# Patient Record
Sex: FEMALE | Race: BLACK OR AFRICAN AMERICAN | NOT HISPANIC OR LATINO | Employment: FULL TIME | URBAN - METROPOLITAN AREA
[De-identification: names, ages, dates, MRNs, and addresses within clinical notes are randomized per-mention and may not be internally consistent; named-entity substitution may affect disease eponyms.]

---

## 2017-01-03 ENCOUNTER — HOSPITAL ENCOUNTER (EMERGENCY)
Facility: OTHER | Age: 22
Discharge: HOME | End: 2017-01-03
Attending: OBSTETRICS & GYNECOLOGY | Admitting: OBSTETRICS & GYNECOLOGY
Payer: MEDICAID

## 2017-01-03 ENCOUNTER — ROUTINE PRENATAL (OUTPATIENT)
Dept: OBSTETRICS AND GYNECOLOGY | Facility: CLINIC | Age: 22
End: 2017-01-03
Payer: MEDICAID

## 2017-01-03 ENCOUNTER — ANESTHESIA EVENT (OUTPATIENT)
Dept: OBSTETRICS AND GYNECOLOGY | Facility: OTHER | Age: 22
End: 2017-01-03
Payer: MEDICAID

## 2017-01-03 ENCOUNTER — ANESTHESIA (OUTPATIENT)
Dept: OBSTETRICS AND GYNECOLOGY | Facility: OTHER | Age: 22
End: 2017-01-03
Payer: MEDICAID

## 2017-01-03 VITALS
WEIGHT: 163 LBS | DIASTOLIC BLOOD PRESSURE: 82 MMHG | OXYGEN SATURATION: 100 % | HEART RATE: 106 BPM | RESPIRATION RATE: 18 BRPM | TEMPERATURE: 98 F | BODY MASS INDEX: 27.98 KG/M2 | SYSTOLIC BLOOD PRESSURE: 121 MMHG

## 2017-01-03 VITALS
DIASTOLIC BLOOD PRESSURE: 80 MMHG | WEIGHT: 163.56 LBS | SYSTOLIC BLOOD PRESSURE: 130 MMHG | BODY MASS INDEX: 28.08 KG/M2

## 2017-01-03 DIAGNOSIS — O34.33 CERVICAL CERCLAGE SUTURE PRESENT IN THIRD TRIMESTER: ICD-10-CM

## 2017-01-03 DIAGNOSIS — O09.293: ICD-10-CM

## 2017-01-03 DIAGNOSIS — O34.33 CERVICAL CERCLAGE SUTURE PRESENT, THIRD TRIMESTER: Primary | ICD-10-CM

## 2017-01-03 DIAGNOSIS — O09.93 SUPERVISION OF HIGH-RISK PREGNANCY, THIRD TRIMESTER: Primary | ICD-10-CM

## 2017-01-03 DIAGNOSIS — O34.33 CERVICAL CERCLAGE SUTURE PRESENT, THIRD TRIMESTER: ICD-10-CM

## 2017-01-03 PROCEDURE — 99213 OFFICE O/P EST LOW 20 MIN: CPT | Mod: TH,S$PBB,, | Performed by: STUDENT IN AN ORGANIZED HEALTH CARE EDUCATION/TRAINING PROGRAM

## 2017-01-03 PROCEDURE — 59025 FETAL NON-STRESS TEST: CPT | Mod: 26,,, | Performed by: OBSTETRICS & GYNECOLOGY

## 2017-01-03 PROCEDURE — 59025 FETAL NON-STRESS TEST: CPT

## 2017-01-03 PROCEDURE — 99999 PR PBB SHADOW E&M-EST. PATIENT-LVL II: CPT | Mod: PBBFAC,,, | Performed by: STUDENT IN AN ORGANIZED HEALTH CARE EDUCATION/TRAINING PROGRAM

## 2017-01-03 PROCEDURE — 99283 EMERGENCY DEPT VISIT LOW MDM: CPT | Mod: 25,27

## 2017-01-03 PROCEDURE — 99283 EMERGENCY DEPT VISIT LOW MDM: CPT | Mod: 25,,, | Performed by: OBSTETRICS & GYNECOLOGY

## 2017-01-03 RX ORDER — BUTORPHANOL TARTRATE 1 MG/ML
1 INJECTION INTRAMUSCULAR; INTRAVENOUS ONCE
Status: DISCONTINUED | OUTPATIENT
Start: 2017-01-03 | End: 2017-01-03

## 2017-01-03 NOTE — MR AVS SNAPSHOT
[place] [facility]  [address] [facility] [address]  Phone: [phone]                  Juanita Lipscomb   1/3/2017 3:00 PM   Routine Prenatal    Description:  Female : 1995   Provider:  Joaquin Andrew MD   Department:  [place] [facility]           Reason for Visit     Routine Prenatal Visit           Diagnoses this Visit        Comments    Supervision of high-risk pregnancy, third trimester    -  Primary            To Do List           Goals (5 Years of Data)     None      Follow-Up and Disposition     Return in about 1 week (around 1/10/2017).      [facility]     [facility] -  Assistance  Registered nurses in the [facility] provide clinical advisement, health education, appointment booking, and other advisory services.  Call for this free service at [phone].             Medications           Message regarding Medications     Verify the changes and/or additions to your medication regime listed below are the same as discussed with your clinician today.  If any of these changes or additions are incorrect, please notify your healthcare provider.             Verify that the below list of medications is an accurate representation of the medications you are currently taking.  If none reported, the list may be blank. If incorrect, please contact your healthcare provider. Carry this list with you in case of emergency.           Current Medications     PRENATAL VIT #76/IRON,CARB/FA (PNV 29-1 ORAL) Take 1 tablet by mouth once daily.    promethazine (PHENERGAN) 25 MG tablet Take 1 tablet (25 mg total) by mouth every 4 (four) hours.           Clinical Reference Information           Prenatal Vitals     Enc. Date GA Prenatal Vitals Prenatal Pulse Pain Level Urine Albumin/Glucose Edema Presentation Dilation/Effacement/Station    1/3/17 36w2d 130/80 / 74.2 kg (163 lb 9.3 oz)   0 Negative / Negative None / None / None      16 35w4d 112/72  "/ 73.8 kg (162 lb 11.2 oz) 35 cm / 145 / Present  0 Negative / Negative None / None      16 32w4d 104/64 / 73.4 kg (161 lb 13.1 oz) 32 cm / 150 / Present  0 Negative / Negative None / None / None      16 30w3d 100/60 / 71.4 kg (157 lb 6.5 oz) 30 cm / 132 / Present  0 Negative / Negative None / None / None      16 28w2d 110/60 / 70.1 kg (154 lb 8.7 oz) 28 cm / 148 / Present  0 Negative / Negative None / None / None      10/25/16 26w2d Admission Dx: Pelvic pain affecting pregnancy Dept: [facility], [place], operated by [facility] OB ASSE    10/25/16 26w2d 102/60 / 69.1 kg (152 lb 5.4 oz)   9  None / None / None      10/4/16 23w2d 110/70 / 66.9 kg (147 lb 7.8 oz) 21 cm / 146 / Present  0 Negative / Negative None / None / None      16 19w2d 110/60 / 64.4 kg (141 lb 15.6 oz) 20 cm / positive  0 Negative / Negative None / None / None      8/10/16 15w3d 110/60 / 63.1 kg (139 lb 1.8 oz) 15 cm / pos  0 Negative / Negative None / None / None      16 14w2d Admission Dx: History of incompetent cervix, currently pregnant, second trimester Dept: [facility], [place], operated by [facility] L&D    16 11w4d 120/70 / 61.4 kg (135 lb 5.8 oz)  / + / Absent  0 Negative / Negative None / None / None      16 9w4d 100/60 / 59.4 kg (130 lb 15.3 oz)           16 8w4d 120/68 / 58.1 kg (128 lb 1.4 oz)   7 Negative / Negative None / None / None         TW.1 kg (35 lb 7.9 oz)   Pregravid weight: 58.1 kg (128 lb 1.4 oz)   Number of fetuses: 1   Height: 5' 4" (1.626 m)   BMI: 22.0       Vital Signs - Last Recorded  Most recent update: 1/3/2017  3:25 PM by Jimena Holliday LPN    BP Wt LMP BMI       130/80 74.2 kg (163 lb 9.3 oz) 04/15/2016 28.08 kg/m2       Allergies as of 1/3/2017     Watermelon      Immunizations Administered on Date of Encounter - 1/3/2017     None      "

## 2017-01-03 NOTE — PROGRESS NOTES
Complaints today: Patient complains of intermittent episodes of lightheadedness and blurry vision, most recently in the car earlier today. Occasional painless contractions. Denies VB, LOF. Good FM.    Visit Vitals    /80    Wt 74.2 kg (163 lb 9.3 oz)    LMP 04/15/2016    BMI 28.08 kg/m2       21 y.o., at 36w2d by Estimated Date of Delivery: 17  Patient Active Problem List   Diagnosis    Supervision of high-risk pregnancy (bottle feed/mirena/flu done/TDAP ordered)    History of incompetent cervix, currently pregnant    Cervical cerclage suture present     OB History    Para Term  AB SAB TAB Ectopic Multiple Living   3 2 2 0 0 0 0 0 0 2      # Outcome Date GA Lbr Tom/2nd Weight Sex Delivery Anes PTL Lv   3 Current            2 Term 14 38w0d  3.402 kg (7 lb 8 oz) M Vag-Spont EPI N Y   1 Term 11 37w0d  3.147 kg (6 lb 15 oz) M Vag-Spont EPI N Y          Dating reviewed    Allergies and problem list reviewed and updated    Medical and surgical history reviewed    Prenatal labs reviewed and updated    Physical Exam:  ABD: soft, gravid, nontender    Assessment:  22yo  at 36.2 wga for prenatal visit    Plan:   No complaints today  Attempted cerclage removal for second time. Patient unable to tolerate exam. Will send to [facility] for cerclage removal today     follow up 1 Weeks, kick counts, labor precautions discussed    Herman Andrew MD  PGY-1 OB/GYN  [phone]

## 2017-01-03 NOTE — ED PROVIDER NOTES
Encounter Date: 1/3/2017       History     Chief Complaint   Patient presents with    Cerclage removal     Review of patient's allergies indicates:   Allergen Reactions    Watermelon Shortness Of Breath, Itching, Swelling and Edema     Chest pain, angioedema, SOB, after eating watermelon tonight     HPI Comments: Juanita Lipscomb is a 21 y.o. [id number] at 36w2d presents from clinic for cerclage removal 2/2 inability to tolerate removal in clinic.   This IUP is complicated by history of incompetent cervix.  Patient denies contractions, denies vaginal bleeding, denies LOF.   Fetal Movement: normal.      The history is provided by the patient.     Past Medical History   Diagnosis Date    Incompetent cervix     Thyroid disease      Hypothyroidism     No past medical history pertinent negatives.  Past Surgical History   Procedure Laterality Date    Cervical cerclage       Family History   Problem Relation Age of Onset    Hypertension Mother     Breast cancer Neg Hx     Eclampsia Neg Hx      Social History   Substance Use Topics    Smoking status: Never Smoker    Smokeless tobacco: None    Alcohol use No     Review of Systems   Constitutional: Negative for fever.   HENT: Negative for sore throat.    Respiratory: Negative for shortness of breath.    Cardiovascular: Negative for chest pain.   Gastrointestinal: Negative for nausea.   Genitourinary: Negative for dysuria.   Musculoskeletal: Negative for back pain.   Skin: Negative for rash.   Neurological: Negative for weakness.   Hematological: Does not bruise/bleed easily.       Physical Exam   Initial Vitals   BP Pulse Resp Temp SpO2   17 1710 17 1710 17 1711 17 1711 17 1711   104/63 96 18 97.9 °F (36.6 °C) 100 %     Physical Exam    Nursing note and vitals reviewed.  Constitutional: She appears well-developed and well-nourished. She is not diaphoretic. No distress.   HENT:   Head: Normocephalic and atraumatic.   Eyes: Conjunctivae and  EOM are normal.   Neck: Normal range of motion.   Cardiovascular: Normal rate.   Pulmonary/Chest: No respiratory distress.   Musculoskeletal: Normal range of motion.   Neurological: She is alert and oriented to person, place, and time.   Skin: Skin is warm and dry.   Psychiatric: She has a normal mood and affect.     OB LABOR EXAM:                     Comments: FHT: 140bpm, moderate btbv, + accels, one late decel, reassuring--> no decels x 3hours  TOCO: 1-2 contractions/hour (patient not feeling)       ED Course   Procedures  Labs Reviewed - No data to display          Medical Decision Making:   Initial Assessment:   Cerclage removal indicated, 36.2wga      ED Management:  Cerclage knot visualized, tented up with ring forceps, suture cut and removed intact. Under nitrous.    PTL precautions given.                     ED Course     Clinical Impression:   The encounter diagnosis was Cervical cerclage suture present, third trimester.      Lali Montemayor MD  PGY-2 OB/GYN  [phone]       Lali Montemayor MD  Resident  01/03/17 [id number]

## 2017-01-04 NOTE — DISCHARGE INSTRUCTIONS
The following signs may be a warning that you may need medical care.  · Severe headache not relieved by tylenol.  · Blurry vision or seeing spots.  · Sudden swelling in your face or hands.  · Sudden weight gain in only a few days.  · Severe stomach pains or cramps.  · Vomiting lasting more than 24 hours.  · Fever greater than 100.4 degrees.  · Vaginal bleeding that is more than just spotting.  · Excessive and unusual vaginal discharge.  · A gush or flow of watery fluid from your vagina.  · Decrease or absence of baby's movement (starting at 28 weeks).  ·  (less than 37 weeks) labor: more than 4 contractions in an hour for 2 hours.  · Term (greater than 37 weeks) labor: contractions every 5 minutes for 2 hours.

## 2017-01-04 NOTE — ANESTHESIA PREPROCEDURE EVALUATION
01/03/2017  Juanita Lipscomb is a 21 y.o., female.    [facility] Anesthesia Evaluation    I have reviewed the Patient Summary Reports.    I have reviewed the Nursing Notes.   I have reviewed the Medications.     Review of Systems  Anesthesia Hx:  No problems with previous Anesthesia  History of prior surgery of interest to airway management or planning: Denies Family Hx of Anesthesia complications.   Denies Personal Hx of Anesthesia complications.   Hematology/Oncology:         -- Anemia:   Cardiovascular:   Exercise tolerance: good Denies Hypertension.  Denies CAD.       Pulmonary:   Denies COPD.  Denies Asthma.  Denies Sleep Apnea.    Renal/:   Denies Chronic Renal Disease.     Hepatic/GI:   Denies GERD. Denies Liver Disease.    Neurological:   Denies CVA. Denies Seizures.    Endocrine:   Denies Diabetes. Hypothyroidism        Physical Exam  General:  Well nourished    Airway/Jaw/Neck:  Airway Findings: Mouth Opening: Normal Tongue: Normal  General Airway Assessment: Adult  Mallampati: II  Improves to I with phonation.  TM Distance: Normal, at least 6 cm  Jaw/Neck Findings:  Neck ROM: Normal ROM      Dental:  Dental Findings: In tact, upper braces, lower braces   Chest/Lungs:  Chest/Lungs Findings: Clear to auscultation, Normal Respiratory Rate     Heart/Vascular:  Heart Findings: Rate: Normal  Rhythm: Regular Rhythm  Sounds: Normal        Mental Status:  Mental Status Findings:  Cooperative, Alert and Oriented         Anesthesia Plan  Type of Anesthesia, risks & benefits discussed:  Anesthesia Type:  CSE, epidural, general, spinal, MAC  Patient's Preference:   Intra-op Monitoring Plan:   Intra-op Monitoring Plan Comments:   Post Op Pain Control Plan:   Post Op Pain Control Plan Comments:   Induction:    Beta Blocker:  Patient is not currently on a Beta-Blocker (No further documentation required).       Informed  Consent: Patient understands risks and agrees with Anesthesia plan.  Questions answered. Anesthesia consent signed with patient.  ASA Score: 2     Day of Surgery Review of History & Physical:    H&P update referred to the surgeon.         Ready For Surgery From Anesthesia Perspective.

## 2017-01-04 NOTE — ANESTHESIA POSTPROCEDURE EVALUATION
Anesthesia Post Evaluation    Patient: Juanita Lipscomb    Procedure(s) Performed: * No procedures listed *    Final Anesthesia Type: MAC (Nitrous)  Patient location during evaluation: PACU  Patient participation: Yes- Able to Participate  Level of consciousness: awake and alert  Post-procedure vital signs: reviewed and stable  Pain management: adequate  Airway patency: patent  PONV status at discharge: No PONV  Anesthetic complications: no      Cardiovascular status: blood pressure returned to baseline and hemodynamically stable  Respiratory status: unassisted  Hydration status: euvolemic  Follow-up not needed.        Visit Vitals    /82    Pulse 106    Temp 36.6 °C (97.9 °F) (Temporal)    Resp 18    Wt 73.9 kg (163 lb)    LMP 04/15/2016    SpO2 100%    Breastfeeding No    BMI 27.98 kg/m2       Pain/Dewayne Score: No Data Recorded

## 2017-01-10 ENCOUNTER — HOSPITAL ENCOUNTER (EMERGENCY)
Facility: OTHER | Age: 22
Discharge: HOME | End: 2017-01-10
Attending: OBSTETRICS & GYNECOLOGY
Payer: MEDICAID

## 2017-01-10 VITALS
HEART RATE: 74 BPM | TEMPERATURE: 98 F | OXYGEN SATURATION: 87 % | SYSTOLIC BLOOD PRESSURE: 110 MMHG | DIASTOLIC BLOOD PRESSURE: 74 MMHG

## 2017-01-10 DIAGNOSIS — O47.9 IRREGULAR UTERINE CONTRACTIONS: Primary | ICD-10-CM

## 2017-01-10 DIAGNOSIS — O21.9 NAUSEA/VOMITING IN PREGNANCY: ICD-10-CM

## 2017-01-10 DIAGNOSIS — Z3A.37 37 WEEKS GESTATION OF PREGNANCY: ICD-10-CM

## 2017-01-10 LAB
BILIRUB SERPL-MCNC: NORMAL MG/DL
BLOOD URINE, POC: NORMAL
COLOR, POC UA: NORMAL
GLUCOSE UR QL STRIP: NORMAL
KETONES UR QL STRIP: NORMAL
LEUKOCYTE ESTERASE URINE, POC: NORMAL
NITRITE, POC UA: NORMAL
PH, POC UA: NORMAL
PROTEIN, POC: NORMAL
SPECIFIC GRAVITY, POC UA: NORMAL
UROBILINOGEN, POC UA: NORMAL

## 2017-01-10 PROCEDURE — 59025 FETAL NON-STRESS TEST: CPT

## 2017-01-10 PROCEDURE — 99283 EMERGENCY DEPT VISIT LOW MDM: CPT | Mod: 25

## 2017-01-10 NOTE — DISCHARGE INSTRUCTIONS
Call clinic [phone] or L & D after hours at [phone] for vaginal bleeding, leakage of fluids, regular contractions every 5 mins for 2 hours, decreased fetal movements ( 10 kicks in 2 hours), headache not relieved by Tylenol, blurry vision, or temp of 100.4 or greater.  Begin doing fetal kick counts, at least 10 movements in 2 hours starting at 28 weeks gestation.  Keep next clinic appointment

## 2017-01-10 NOTE — ED PROVIDER NOTES
Encounter Date: 1/10/2017       History     Chief Complaint   Patient presents with    Contractions     Review of patient's allergies indicates:   Allergen Reactions    Watermelon Shortness Of Breath, Itching, Swelling and Edema     Chest pain, angioedema, SOB, after eating watermelon tonight     HPI Comments: Juanita Lipscomb is a 21 y.o. [id number] at 37w2d presents complaining of contractions and nausea. The contractions began at 0300 morning and were 3 minutes apart. They have since decreased in frequency to 6-7 minutes apart. Patient complains of continued moderate pain with contractions.     Patient also complains of nausea that started this morning. She has had 3 episodes of emesis, and has not tolerated PO since dinner last night. She denies diarrhea or sick contacts at home. She denies fever or chills.    This IUP is complicated by cerclage placement (patient 2 weeks s/p removal).  Patient denies vaginal bleeding, denies LOF.   Fetal Movement: normal.      Past Medical History   Diagnosis Date    Incompetent cervix     Thyroid disease      Hypothyroidism     No past medical history pertinent negatives.  Past Surgical History   Procedure Laterality Date    Cervical cerclage       Family History   Problem Relation Age of Onset    Hypertension Mother     Breast cancer Neg Hx     Eclampsia Neg Hx      Social History   Substance Use Topics    Smoking status: Never Smoker    Smokeless tobacco: None    Alcohol use No     Review of Systems   Constitutional: Negative for chills and fever.   Eyes: Negative for photophobia.   Respiratory: Negative for chest tightness and shortness of breath.    Cardiovascular: Negative for chest pain and leg swelling.   Gastrointestinal: Positive for abdominal pain, nausea and vomiting. Negative for diarrhea.   Genitourinary: Positive for pelvic pain. Negative for vaginal bleeding and vaginal discharge.   Neurological: Negative for light-headedness.       Physical Exam   Initial  Vitals   BP Pulse Resp Temp SpO2   01/10/17 0837 01/10/17 0837 -- 01/10/17 0837 01/10/17 0837   121/77 83  97.5 °F (36.4 °C) 100 %     Physical Exam    Constitutional: She appears well-developed and well-nourished. No distress.   Cardiovascular: Normal rate and regular rhythm.   Pulmonary/Chest: Breath sounds normal.   Abdominal: Soft. Bowel sounds are normal. There is no tenderness. There is no rebound.   Musculoskeletal: Normal range of motion. She exhibits no edema or tenderness.   Neurological: She is alert and oriented to person, place, and time. She has normal strength.     OB LABOR EXAM:       Method: Sterile vaginal exam per MD.   Vaginal Bleeding: none present.     Dilatation: 2.   Station: -3.       Comments: FHT: reassuring, 140bpm, moderate BTB variability, + accels,  No decels  TOCO: q 6-10 min         ED Course   Procedures  Labs Reviewed - No data to display          Medical Decision Making:   Initial Assessment:   21 y.o. [id number] at 37w2d presents complaining of contractions and nausea.  Differential Diagnosis:   Irregular contractions  Nausea/vomiting  ED Management:  Patient complains of irregular contractions. NST reactive and reassuring. Irregular contractions on TOCO. SVE 3/60/-3. Unchanged from previous exam in clinic.     Patient complains of continued nausea. Udip unremarkable. Tolerating PO in the room without difficulty. No medications given.     Pt was given routine pregnancy instructions including to return to triage if she had any vaginal bleeding (other than spotting for the next 48hrs), any loss of fluid like her water broke, decreased fetal movement, or contractions Q 5min  lasting for 2 or more hours. Pt was also instructed to drink copious water. Patient voiced understanding of all theseinstructions and was subsequently discharged home.    Herman Andrew MD  PGY-1 OB/GYN  [phone]    Discussed plan with Dr. Lange who was agreement.                     ED Course     Clinical  Impression:   The primary encounter diagnosis was Irregular uterine contractions. Diagnoses of Nausea/vomiting in pregnancy and 37 weeks gestation of pregnancy were also pertinent to this visit.    Disposition:   Disposition: Discharged  Condition: Stable       Joaquin Andrew MD  Resident  01/10/17 1213

## 2017-01-10 NOTE — ED NOTES
Pt discharged home, VSS, not in distress. Instructions given to patient on recognizing labor as well as information about follow-up appointment. Pt verbalized understanding.

## 2017-01-12 ENCOUNTER — ROUTINE PRENATAL (OUTPATIENT)
Dept: OBSTETRICS AND GYNECOLOGY | Facility: CLINIC | Age: 22
End: 2017-01-12
Payer: MEDICAID

## 2017-01-12 VITALS
DIASTOLIC BLOOD PRESSURE: 74 MMHG | BODY MASS INDEX: 27.66 KG/M2 | SYSTOLIC BLOOD PRESSURE: 114 MMHG | WEIGHT: 161.19 LBS

## 2017-01-12 DIAGNOSIS — O09.93 SUPERVISION OF HIGH-RISK PREGNANCY, THIRD TRIMESTER: Primary | ICD-10-CM

## 2017-01-12 PROCEDURE — 99213 OFFICE O/P EST LOW 20 MIN: CPT | Mod: TH,S$PBB,, | Performed by: STUDENT IN AN ORGANIZED HEALTH CARE EDUCATION/TRAINING PROGRAM

## 2017-01-12 PROCEDURE — 99213 OFFICE O/P EST LOW 20 MIN: CPT | Mod: PBBFAC,PO | Performed by: STUDENT IN AN ORGANIZED HEALTH CARE EDUCATION/TRAINING PROGRAM

## 2017-01-12 PROCEDURE — 99999 PR PBB SHADOW E&M-EST. PATIENT-LVL III: CPT | Mod: PBBFAC,,, | Performed by: STUDENT IN AN ORGANIZED HEALTH CARE EDUCATION/TRAINING PROGRAM

## 2017-01-12 NOTE — PROGRESS NOTES
Pt doing well. Denies vaginal bleeding, LOF. Irregular contractions. Reports regular fetal movement.  VS reviewed.    Labor unit precautions reviewed.  RTC: 1 week

## 2017-01-12 NOTE — MR AVS SNAPSHOT
[place] [facility]  [address] [facility] [address]  Phone: [phone]                  Juanita Lipscomb   2017 1:00 PM   Routine Prenatal    Description:  Female : 1995   Provider:  Summer Núñez MD   Department:  [facility]           Reason for Visit     Routine Prenatal Visit           Diagnoses this Visit        Comments    Supervision of high-risk pregnancy, third trimester    -  Primary            To Do List           Goals (5 Years of Data)     None      Follow-Up and Disposition     Return in about 1 week (around 2017).      [facility]     [facility] -  Assistance  Registered nurses in the [facility] provide clinical advisement, health education, appointment booking, and other advisory services.  Call for this free service at [phone].             Medications           Message regarding Medications     Verify the changes and/or additions to your medication regime listed below are the same as discussed with your clinician today.  If any of these changes or additions are incorrect, please notify your healthcare provider.             Verify that the below list of medications is an accurate representation of the medications you are currently taking.  If none reported, the list may be blank. If incorrect, please contact your healthcare provider. Carry this list with you in case of emergency.           Current Medications     PRENATAL VIT #76/IRON,CARB/FA (PNV 29-1 ORAL) Take 1 tablet by mouth once daily.    promethazine (PHENERGAN) 25 MG tablet Take 1 tablet (25 mg total) by mouth every 4 (four) hours.           Clinical Reference Information           Prenatal Vitals     Enc. Date GA Prenatal Vitals Prenatal Pulse Pain Level Urine Albumin/Glucose Edema Presentation Dilation/Effacement/Station    17 37w4d 114/74 / 73.1 kg (161 lb 2.5 oz)   8 Negative / Negative       1/10/17 37w2d Admission Dept: [facility] OB ER  "   1/3/17 36w2d Admission Dept: [facility] OB ER    1/3/17 36w2d 130/80 / 74.2 kg (163 lb 9.3 oz) 35 cm / 140 / Present  0 Negative / Negative None / None / None      12/29/16 35w4d 112/72 / 73.8 kg (162 lb 11.2 oz) 35 cm / 145 / Present  0 Negative / Negative None / None      12/8/16 32w4d 104/64 / 73.4 kg (161 lb 13.1 oz) 32 cm / 150 / Present  0 Negative / Negative None / None / None      11/23/16 30w3d 100/60 / 71.4 kg (157 lb 6.5 oz) 30 cm / 132 / Present  0 Negative / Negative None / None / None      11/8/16 28w2d 110/60 / 70.1 kg (154 lb 8.7 oz) 28 cm / 148 / Present  0 Negative / Negative None / None / None      10/25/16 26w2d Admission Dx: Pelvic pain affecting pregnancy Dept: [facility] OB ASSE    10/25/16 26w2d 102/60 / 69.1 kg (152 lb 5.4 oz)   9  None / None / None      10/4/16 23w2d 110/70 / 66.9 kg (147 lb 7.8 oz) 21 cm / 146 / Present  0 Negative / Negative None / None / None      9/6/16 19w2d 110/60 / 64.4 kg (141 lb 15.6 oz) 20 cm / positive  0 Negative / Negative None / None / None      8/10/16 15w3d 110/60 / 63.1 kg (139 lb 1.8 oz) 15 cm / pos  0 Negative / Negative None / None / None      8/2/16 14w2d Admission Dx: History of incompetent cervix, currently pregnant, second trimester Dept: [facility] L&D    7/14/16 11w4d 120/70 / 61.4 kg (135 lb 5.8 oz)  / + / Absent  0 Negative / Negative None / None / None      6/30/16 9w4d 100/60 / 59.4 kg (130 lb 15.3 oz)           6/14/16 8w4d 120/68 / 58.1 kg (128 lb 1.4 oz)   7 Negative / Negative None / None / None         TWG: 15 kg (33 lb 1.1 oz)   Pregravid weight: 58.1 kg (128 lb 1.4 oz)   Number of fetuses: 1   Height: 5' 4" (1.626 m)   BMI: 22.0       Vital Signs - Last Recorded  Most recent update: 1/12/2017  1:21 PM by Prabha Gonsales MA    BP Wt LMP BMI       114/74 73.1 kg (161 lb 2.5 oz) 04/15/2016 27.66 kg/m2       Allergies as of 1/12/2017     Watermelon      Immunizations Administered on Date of Encounter - 1/12/2017     None      "

## 2017-01-16 ENCOUNTER — ANESTHESIA EVENT (OUTPATIENT)
Dept: OBSTETRICS AND GYNECOLOGY | Facility: OTHER | Age: 22
End: 2017-01-16
Payer: MEDICAID

## 2017-01-16 ENCOUNTER — ANESTHESIA (OUTPATIENT)
Dept: OBSTETRICS AND GYNECOLOGY | Facility: OTHER | Age: 22
End: 2017-01-16
Payer: MEDICAID

## 2017-01-16 ENCOUNTER — HOSPITAL ENCOUNTER (INPATIENT)
Facility: OTHER | Age: 22
LOS: 2 days | Discharge: HOME | End: 2017-01-18
Attending: OBSTETRICS & GYNECOLOGY | Admitting: OBSTETRICS & GYNECOLOGY
Payer: MEDICAID

## 2017-01-16 DIAGNOSIS — O47.9 IRREGULAR CONTRACTIONS: Primary | ICD-10-CM

## 2017-01-16 LAB
ABO + RH BLD: NORMAL
ANISOCYTOSIS BLD QL SMEAR: SLIGHT
BASOPHILS # BLD AUTO: 0.04 K/UL
BASOPHILS NFR BLD: 0.4 %
BLD GP AB SCN CELLS X3 SERPL QL: NORMAL
DIFFERENTIAL METHOD: ABNORMAL
EOSINOPHIL # BLD AUTO: 0.2 K/UL
EOSINOPHIL NFR BLD: 2.4 %
ERYTHROCYTE [DISTWIDTH] IN BLOOD BY AUTOMATED COUNT: 13.7 %
GIANT PLATELETS BLD QL SMEAR: PRESENT
HCT VFR BLD AUTO: 27.1 %
HGB BLD-MCNC: 9.1 G/DL
LYMPHOCYTES # BLD AUTO: 2.2 K/UL
LYMPHOCYTES NFR BLD: 22.2 %
MCH RBC QN AUTO: 25.3 PG
MCHC RBC AUTO-ENTMCNC: 33.6 %
MCV RBC AUTO: 76 FL
MONOCYTES # BLD AUTO: 0.8 K/UL
MONOCYTES NFR BLD: 7.7 %
NEUTROPHILS # BLD AUTO: 6.4 K/UL
NEUTROPHILS NFR BLD: 67.3 %
PLATELET # BLD AUTO: 128 K/UL
PLATELET BLD QL SMEAR: ABNORMAL
PMV BLD AUTO: ABNORMAL FL
POIKILOCYTOSIS BLD QL SMEAR: SLIGHT
POLYCHROMASIA BLD QL SMEAR: ABNORMAL
RBC # BLD AUTO: 3.59 M/UL
SCHISTOCYTES BLD QL SMEAR: PRESENT
WBC # BLD AUTO: 9.71 K/UL

## 2017-01-16 PROCEDURE — 59025 FETAL NON-STRESS TEST: CPT | Mod: 26,,, | Performed by: OBSTETRICS & GYNECOLOGY

## 2017-01-16 PROCEDURE — 25000003 PHARM REV CODE 250: Performed by: OBSTETRICS & GYNECOLOGY

## 2017-01-16 PROCEDURE — 25000003 PHARM REV CODE 250: Performed by: STUDENT IN AN ORGANIZED HEALTH CARE EDUCATION/TRAINING PROGRAM

## 2017-01-16 PROCEDURE — 59409 OBSTETRICAL CARE: CPT | Mod: AT,,, | Performed by: OBSTETRICS & GYNECOLOGY

## 2017-01-16 PROCEDURE — 62326 NJX INTERLAMINAR LMBR/SAC: CPT | Performed by: ANESTHESIOLOGY

## 2017-01-16 PROCEDURE — 76815 OB US LIMITED FETUS(S): CPT

## 2017-01-16 PROCEDURE — 99285 EMERGENCY DEPT VISIT HI MDM: CPT | Mod: 25

## 2017-01-16 PROCEDURE — 27200710 HC EPIDURAL INFUSION PUMP SET: Performed by: STUDENT IN AN ORGANIZED HEALTH CARE EDUCATION/TRAINING PROGRAM

## 2017-01-16 PROCEDURE — 27800517 HC TRAY,EPIDURAL-CONTINUOUS: Performed by: STUDENT IN AN ORGANIZED HEALTH CARE EDUCATION/TRAINING PROGRAM

## 2017-01-16 PROCEDURE — 86900 BLOOD TYPING SEROLOGIC ABO: CPT

## 2017-01-16 PROCEDURE — 51702 INSERT TEMP BLADDER CATH: CPT

## 2017-01-16 PROCEDURE — 72200004 HC VAGINAL DELIVERY LEVEL I

## 2017-01-16 PROCEDURE — 72100002 HC LABOR CARE, 1ST 8 HOURS

## 2017-01-16 PROCEDURE — 99283 EMERGENCY DEPT VISIT LOW MDM: CPT | Mod: 25,,, | Performed by: OBSTETRICS & GYNECOLOGY

## 2017-01-16 PROCEDURE — 11000001 HC ACUTE MED/SURG PRIVATE ROOM

## 2017-01-16 PROCEDURE — 85025 COMPLETE CBC W/AUTO DIFF WBC: CPT

## 2017-01-16 PROCEDURE — 59409 OBSTETRICAL CARE: CPT | Mod: AA,,, | Performed by: ANESTHESIOLOGY

## 2017-01-16 PROCEDURE — 86901 BLOOD TYPING SEROLOGIC RH(D): CPT

## 2017-01-16 RX ORDER — FENTANYL/BUPIVACAINE/NS/PF 2MCG/ML-.1
PLASTIC BAG, INJECTION (ML) INJECTION CONTINUOUS
Status: DISCONTINUED | OUTPATIENT
Start: 2017-01-16 | End: 2017-01-16

## 2017-01-16 RX ORDER — FENTANYL CITRATE 50 UG/ML
INJECTION, SOLUTION INTRAMUSCULAR; INTRAVENOUS
Status: DISPENSED
Start: 2017-01-16 | End: 2017-01-17

## 2017-01-16 RX ORDER — CARBOPROST TROMETHAMINE 250 UG/ML
250 INJECTION, SOLUTION INTRAMUSCULAR
Status: DISCONTINUED | OUTPATIENT
Start: 2017-01-16 | End: 2017-01-16

## 2017-01-16 RX ORDER — METHYLERGONOVINE MALEATE 0.2 MG/ML
200 INJECTION INTRAVENOUS
Status: DISCONTINUED | OUTPATIENT
Start: 2017-01-16 | End: 2017-01-16

## 2017-01-16 RX ORDER — SODIUM CITRATE AND CITRIC ACID MONOHYDRATE 334; 500 MG/5ML; MG/5ML
30 SOLUTION ORAL ONCE
Status: DISCONTINUED | OUTPATIENT
Start: 2017-01-16 | End: 2017-01-16

## 2017-01-16 RX ORDER — SODIUM CHLORIDE, SODIUM LACTATE, POTASSIUM CHLORIDE, CALCIUM CHLORIDE 600; 310; 30; 20 MG/100ML; MG/100ML; MG/100ML; MG/100ML
INJECTION, SOLUTION INTRAVENOUS CONTINUOUS
Status: DISCONTINUED | OUTPATIENT
Start: 2017-01-16 | End: 2017-01-16

## 2017-01-16 RX ORDER — FAMOTIDINE 10 MG/ML
20 INJECTION INTRAVENOUS ONCE
Status: DISCONTINUED | OUTPATIENT
Start: 2017-01-16 | End: 2017-01-16

## 2017-01-16 RX ORDER — METOCLOPRAMIDE HYDROCHLORIDE 5 MG/ML
10 INJECTION INTRAMUSCULAR; INTRAVENOUS ONCE
Status: DISCONTINUED | OUTPATIENT
Start: 2017-01-16 | End: 2017-01-16

## 2017-01-16 RX ORDER — DIPHENHYDRAMINE HCL 25 MG
25 CAPSULE ORAL EVERY 6 HOURS PRN
Status: DISCONTINUED | OUTPATIENT
Start: 2017-01-16 | End: 2017-01-18 | Stop reason: HOSPADM

## 2017-01-16 RX ORDER — HYDROCORTISONE 25 MG/G
CREAM TOPICAL 3 TIMES DAILY PRN
Status: DISCONTINUED | OUTPATIENT
Start: 2017-01-16 | End: 2017-01-18 | Stop reason: HOSPADM

## 2017-01-16 RX ORDER — LIDOCAINE HYDROCHLORIDE AND EPINEPHRINE 15; 5 MG/ML; UG/ML
INJECTION, SOLUTION EPIDURAL
Status: DISCONTINUED | OUTPATIENT
Start: 2017-01-16 | End: 2017-01-16

## 2017-01-16 RX ORDER — FENTANYL/BUPIVACAINE/NS/PF 2MCG/ML-.1
PLASTIC BAG, INJECTION (ML) INJECTION
Status: DISPENSED
Start: 2017-01-16 | End: 2017-01-17

## 2017-01-16 RX ORDER — MISOPROSTOL 200 UG/1
800 TABLET ORAL
Status: DISCONTINUED | OUTPATIENT
Start: 2017-01-16 | End: 2017-01-16

## 2017-01-16 RX ORDER — IBUPROFEN 600 MG/1
600 TABLET ORAL EVERY 6 HOURS
Status: DISCONTINUED | OUTPATIENT
Start: 2017-01-16 | End: 2017-01-18 | Stop reason: HOSPADM

## 2017-01-16 RX ORDER — BUPIVACAINE HYDROCHLORIDE 2.5 MG/ML
INJECTION, SOLUTION EPIDURAL; INFILTRATION; INTRACAUDAL
Status: DISPENSED
Start: 2017-01-16 | End: 2017-01-17

## 2017-01-16 RX ORDER — OXYTOCIN/RINGER'S LACTATE 20/1000 ML
41.65 PLASTIC BAG, INJECTION (ML) INTRAVENOUS CONTINUOUS
Status: DISCONTINUED | OUTPATIENT
Start: 2017-01-16 | End: 2017-01-16

## 2017-01-16 RX ORDER — OXYCODONE AND ACETAMINOPHEN 10; 325 MG/1; MG/1
1 TABLET ORAL EVERY 4 HOURS PRN
Status: DISCONTINUED | OUTPATIENT
Start: 2017-01-16 | End: 2017-01-18 | Stop reason: HOSPADM

## 2017-01-16 RX ORDER — ONDANSETRON 4 MG/1
8 TABLET, ORALLY DISINTEGRATING ORAL EVERY 8 HOURS PRN
Status: DISCONTINUED | OUTPATIENT
Start: 2017-01-16 | End: 2017-01-18 | Stop reason: HOSPADM

## 2017-01-16 RX ORDER — ONDANSETRON 8 MG/1
8 TABLET, ORALLY DISINTEGRATING ORAL EVERY 8 HOURS PRN
Status: DISCONTINUED | OUTPATIENT
Start: 2017-01-16 | End: 2017-01-16

## 2017-01-16 RX ORDER — OXYCODONE AND ACETAMINOPHEN 5; 325 MG/1; MG/1
1 TABLET ORAL EVERY 4 HOURS PRN
Status: DISCONTINUED | OUTPATIENT
Start: 2017-01-16 | End: 2017-01-18 | Stop reason: HOSPADM

## 2017-01-16 RX ADMIN — LIDOCAINE HYDROCHLORIDE AND EPINEPHRINE 3 ML: 15; 5 INJECTION, SOLUTION EPIDURAL at 01:01

## 2017-01-16 RX ADMIN — SODIUM CHLORIDE, SODIUM LACTATE, POTASSIUM CHLORIDE, AND CALCIUM CHLORIDE: .6; .31; .03; .02 INJECTION, SOLUTION INTRAVENOUS at 12:01

## 2017-01-16 RX ADMIN — IBUPROFEN 600 MG: 600 TABLET, FILM COATED ORAL at 05:01

## 2017-01-16 RX ADMIN — Medication 333 MILLI-UNITS/MIN: at 03:01

## 2017-01-16 RX ADMIN — SODIUM CHLORIDE, SODIUM LACTATE, POTASSIUM CHLORIDE, AND CALCIUM CHLORIDE 1000 ML: .6; .31; .03; .02 INJECTION, SOLUTION INTRAVENOUS at 12:01

## 2017-01-16 RX ADMIN — IBUPROFEN 600 MG: 600 TABLET, FILM COATED ORAL at 11:01

## 2017-01-16 RX ADMIN — DIPHENHYDRAMINE HYDROCHLORIDE 25 MG: 25 CAPSULE ORAL at 04:01

## 2017-01-16 RX ADMIN — OXYCODONE AND ACETAMINOPHEN 1 TABLET: 5; 325 TABLET ORAL at 07:01

## 2017-01-16 RX ADMIN — DIPHENHYDRAMINE HYDROCHLORIDE 25 MG: 25 CAPSULE ORAL at 11:01

## 2017-01-16 NOTE — PROGRESS NOTES
"LABOR NOTE    S:  Complaints: No.  Patient is comfortable with epidural in place. At bedside secondary to FHT deceleration  O:   Visit Vitals    /64    Pulse 80    Temp 97.7 °F (36.5 °C) (Temporal)    Resp 18    Ht 5' 4" (1.626 m)    Wt 73.9 kg (163 lb)    LMP 04/15/2016    SpO2 100%    Breastfeeding No    BMI 27.98 kg/m2         FHT: 120bpm Cat 2 (reassuring), mod beat to beat variability, + accelerations, recurrent variable, responded to maternal repositioning and scalp stim  CTX: q 2-3 minutes  SVE: c/c/+1, SROM clear during epidural per patient      ASSESSMENT:   21 y.o.  at 38w1d, completion of 1st stage of labor      PLAN:    Continue Close Maternal/Fetal Monitoring  Maternal VSSAF  FHT currently cat 2  Dr. Davenport  Notified  Set up for vaginal delivery    Mariposa Childs M.D.  PGY-3 OB/GYN  [phone]    "

## 2017-01-16 NOTE — L&D DELIVERY NOTE
cephalic after approximately 5 minutes of maternal pushing.  Under epidural anesthesia.  Infant delivered OA over intact perineum.  Nuchal x1 delivered through  Infant tolerated the delivery well and was placed on mothers abdomen for skin to skin.  Cord clamped and cut and bulb suctioning performed.  Umbilical arterial gas and venous blood obtained.  Placenta delivered spontaneously and IV pitocin given.  There were no vaginal abrasions.  Uterine tone noted. No cervical lacerations.  Patient tolerated delivery well.   cc  Staff present for entire procedure.  S/L/N counts correct x2.       Delivery Information for  Ulises Lipscomb    Birth information:  YOB: 2019   Time of birth: 3:09 PM   Sex: male   Head Delivery Date/Time: 2017  3:09 PM   Delivery type: Vaginal, Spontaneous Delivery   Gestational Age: 38w1d    Delivery Providers    Delivering clinician:  PRIETO CM   Other personnel:   Provider Role   SUSAN BENITEZ Resident   ONUR AWAD Resident   MADELYN MEDINA Delivery Assist   NELA PRADO Delivery Nurse   MENDEL LAZO Surgical Tech   PEDRO MILLER Registered Nurse                       Assessment    Living status:  Yes   Apgars    1 Minute:   5 Minute:   10 Minute 15 Minute 20 Minute   Skin Color: 1  1       Heart Rate: 2  2       Reflex Irritability: 2  2       Muscle Tone: 2  2       Respiratory Effort: 2  2       Total: 9  9                  Apgars Assigned By:  MAIRA MILLER RN          Assisted Delivery Details:    Forceps attempted?:  No   Vacuum extractor attempted?:  No             Shoulder Dystocia    Shoulder dystocia present?:  No                                             Presentation and Position    Presentation: Vertex   Position:                    Interventions/Resuscitation    Method:  Bulb Suctioning, Tactile Stimulation        Cord    Vessels:  3 vessels   Complications:  Nuchal   Nuchal Intervention:  reduced   Nuchal Cord  Description:  loose nuchal cord   Number of Loops:  1   Delayed Cord Clamping?:  No   Cord Clamped Date/Time:  2017  3:10 PM   Cord Blood Disposition:  Sent with Baby   Gases Sent?:  Yes   Stem Cell Collection (by MD):  No        Placenta    Date and time:  2017  3:12 PM   Removal:  Spontaneous   Appearance:  Intact   Placenta disposition:  Discarded            Labor Events:       labor: No     Labor Onset Date/Time:         Dilation Complete Date/Time: 2017 14:56     Start Pushing Date/Time: 2017 15:05     Rupture Date/Time:              Rupture type:           Fluid Amount:        Fluid Color:        Fluid Odor:        Membrane Status (PeriCalm): SRM (Spontaneous Rupture)      Rupture Date/Time (PeriCalm): 2017 14:57:00      Fluid Amount (PeriCalm): Moderate      Fluid Color (PeriCalm): Clear       steroids: None     Antibiotics given for GBS: No     Induction:       Indications for induction:        Augmentation:       Indications for augmentation:       Labor complications: None     Additional complications:          Cervical ripening:                     Delivery:      Episiotomy: None     Indication for Episiotomy:       Perineal Lacerations: None Repaired:      Periurethral Laceration: none Repaired:     Labial Laceration: none Repaired:     Sulcus Laceration: none Repaired:     Vaginal Laceration: No Repaired:     Cervical Laceration: No Repaired:     Repair suture:       Repair # of packets: 0     Blood loss (ml): 175     Vaginal Sweep Performed: Yes     Surgicount Correct: Yes       Other providers:       Anesthesia    Method:  Epidural              Details (if applicable):  Trial of Labor      Categorization:      Priority:     Indications for :     Incision Type:       Additional  information:  Forceps:    Vacuum:    Breech:    Observed anomalies    Other (Comments):             Herman Andrew MD  PGY-1 OB/GYN  [phone]

## 2017-01-16 NOTE — H&P
History and Physical                                            Obstetrics          Subjective:       Juanita Lipscomb is a 21 y.o.  female with IUP at 38w1d weeks gestation who presents reporting contractions every 3 minutes .    This IUP is complicated by prior loss at 20 weeks with history indicated cerclage placement during this pregnancy.  Patient reports contractions, denies vaginal bleeding, denies LOF.   Fetal Movement: normal.     PMHx:   Past Medical History   Diagnosis Date    Incompetent cervix     Thyroid disease      Hypothyroidism       PSHx:   Past Surgical History   Procedure Laterality Date    Cervical cerclage         All:   Review of patient's allergies indicates:   Allergen Reactions    Watermelon Shortness Of Breath, Itching, Swelling and Edema     Chest pain, angioedema, SOB, after eating watermelon tonight       Meds:   (Not in a hospital admission)    SH:   Social History     Social History    Marital status: Single     Spouse name: N/A    Number of children: N/A    Years of education: N/A     Occupational History    Not on file.     Social History Main Topics    Smoking status: Never Smoker    Smokeless tobacco: Not on file    Alcohol use No    Drug use: No    Sexual activity: Yes     Other Topics Concern    Not on file     Social History Narrative       FH:   Family History   Problem Relation Age of Onset    Hypertension Mother     Breast cancer Neg Hx     Eclampsia Neg Hx        OBHx:   Obstetric History       T2      TAB0   SAB0   E0   M0   L2       # Outcome Date GA Lbr Tom/2nd Weight Sex Delivery Anes PTL Lv   3 Current            2 Term 14 38w0d  3.402 kg (7 lb 8 oz) M Vag-Spont EPI N Y      Name: ashlie   1 Term 11 37w0d  3.147 kg (6 lb 15 oz) M Vag-Spont EPI N Y      Name: davin          Objective:         Visit Vitals    LMP 04/15/2016            General:   alert, appears stated age and cooperative   Lungs:   clear to auscultation  bilaterally   Heart:   regular rate and rhythm, S1, S2 normal, no murmur, click, rub or gallop   Abdomen:  soft, non-tender; bowel sounds normal; no masses,  no organomegaly   Extremities negative edema, negative erythema     Cervical exam: 4/80/-3    SSE: def    Fetal Heart Tones:  NST: reassuring, Category 1, 130 bpm, moderate BTBV, (+) accelerations, (--) decelerations  TOCO: Q 3 min      Ultrasound:  cephalic    EFW by Leopold's: 7    Lab Review  Blood Type B POS  GBBS: negative  Rubella: Immune  RPR: NR  HIV: negative  HepB: negative    Other Labs:   Recent Results (from the past 24 hour(s))   CBC with Auto Differential    Collection Time: 17 12:42 PM   Result Value Ref Range    WBC 9.71 3.90 - 12.70 K/uL    RBC 3.59 (L) 4.00 - 5.40 M/uL    Hemoglobin 9.1 (L) 12.0 - 16.0 g/dL    Hematocrit 27.1 (L) 37.0 - 48.5 %    MCV 76 (L) 82 - 98 fL    MCH 25.3 (L) 27.0 - 31.0 pg    MCHC 33.6 32.0 - 36.0 %    RDW 13.7 11.5 - 14.5 %    Platelets 128 (L) 150 - 350 K/uL    MPV SEE COMMENT 9.2 - 12.9 fL    Gran # 6.4 1.8 - 7.7 K/uL    Lymph # 2.2 1.0 - 4.8 K/uL    Mono # 0.8 0.3 - 1.0 K/uL    Eos # 0.2 0.0 - 0.5 K/uL    Baso # 0.04 0.00 - 0.20 K/uL    Gran% 67.3 38.0 - 73.0 %    Lymph% 22.2 18.0 - 48.0 %    Mono% 7.7 4.0 - 15.0 %    Eosinophil% 2.4 0.0 - 8.0 %    Basophil% 0.4 0.0 - 1.9 %    Platelet Estimate Decreased (A)     Aniso Slight     Poik Slight     Poly Occasional     Schistocytes Present     Large/Giant Platelets Present     Differential Method Automated    Type & Screen    Collection Time: 17 12:42 PM   Result Value Ref Range    Group & Rh B POS     Indirect Boogie NEG             Assessment:     21 y.o.  female with IUP at 38w1d weeks gestation with history of cerclage placement for PTL admitted for Labor       Plan:          Labor  - Admit to Labor and Delivery unit  - Consents for delivery including vaginal or  section and blood transfusion signed and to chart  - Risks, benefits,  alternatives and possible complications have been discussed in detail with the patient.   - Epidural per Anesthesia  - Draw CBC, T&S  - Notify Staff  - Recheck in 2 hrs or PRN          Post-Partum Hemorrhage risk - low    Joaquin Andrew MD

## 2017-01-16 NOTE — ANESTHESIA PREPROCEDURE EVALUATION
2017  Juanita Lipscomb is a 21 y.o., female  at 38 and 1 here in active labor at 4cm dilation.  Previously with vaginal deliver under functional epidural x2.  Has had cerclages for this and prior pregnancies-removed 1/3/17.    PMH significant for hypothyroidism-on stable dose of synthroid    Denies Cardiac, pulmonary, hepatic, renal, GI, hematologic, spinal disorders.       [facility] Anesthesia Evaluation    I have reviewed the Patient Summary Reports.    I have reviewed the Nursing Notes.   I have reviewed the Medications.     Review of Systems  Anesthesia Hx:  No problems with previous Anesthesia  History of prior surgery of interest to airway management or planning: Denies Family Hx of Anesthesia complications.   Denies Personal Hx of Anesthesia complications.   Social:  Non-Smoker, No Alcohol Use    Hematology/Oncology:     Oncology Normal    -- Denies Anemia:   EENT/Dental:EENT/Dental Normal   Cardiovascular:   Exercise tolerance: good Denies Hypertension.  Denies MI.  Denies CAD.       Pulmonary:  Pulmonary Normal    Hepatic/GI:  Hepatic/GI Normal    Musculoskeletal:  Musculoskeletal Normal    Neurological:  Neurology Normal    Endocrine:   Hypothyroidism        Physical Exam  General:  Well nourished    Airway/Jaw/Neck:  Airway Findings: Mouth Opening: Normal Tongue: Normal  General Airway Assessment: Adult, Good  Mallampati: I  TM Distance: Normal, at least 6 cm  Jaw/Neck Findings:  Neck ROM: Normal ROM      Dental:  Dental Findings: In tact   Chest/Lungs:  Chest/Lungs Findings: Normal Respiratory Rate     Heart/Vascular:  Heart Findings: Rate: Normal  Rhythm: Regular Rhythm        Mental Status:  Mental Status Findings:  Cooperative, Alert and Oriented         Anesthesia Plan  Type of Anesthesia, risks & benefits discussed:  Anesthesia Type:  general, CSE, epidural, spinal  Patient's Preference:    Intra-op Monitoring Plan:   Intra-op Monitoring Plan Comments:   Post Op Pain Control Plan:   Post Op Pain Control Plan Comments:   Induction:    Beta Blocker:  Patient is not currently on a Beta-Blocker (No further documentation required).       Informed Consent: Patient understands risks and agrees with Anesthesia plan.  Questions answered. Anesthesia consent signed with patient.  ASA Score: 2     Day of Surgery Review of History & Physical: I have interviewed and examined the patient. I have reviewed the patient's H&P dated:            Ready For Surgery From Anesthesia Perspective.

## 2017-01-16 NOTE — IP AVS SNAPSHOT
[facility] Location ([facility])  [address] [place], [place] [address]  Phone: [phone]           Patient Discharge Instructions     Our goal is to set you up for success. This packet includes information on your condition, medications, and your home care. It will help you to care for yourself so you don't get sicker and need to go back to the hospital.     Please ask your nurse if you have any questions.        There are many details to remember when preparing to leave the hospital. Here is what you will need to do:    1. Take your medicine. If you are prescribed medications, review your Medication List in the following pages. You may have new medications to  at the pharmacy and others that you'll need to stop taking. Review the instructions for how and when to take your medications. Talk with your doctor or nurses if you are unsure of what to do.     2. Go to your follow-up appointments. Specific follow-up information is listed in the following pages. Your may be contacted by a transition nurse or clinical provider about future appointments. Be sure we have all of the phone numbers to reach you, if needed. Please contact your provider's office if you are unable to make an appointment.     3. Watch for warning signs. Your doctor or nurse will give you detailed warning signs to watch for and when to call for assistance. These instructions may also include educational information about your condition. If you experience any of warning signs to your health, call your doctor.               [facility]  Unless otherwise directed by your provider, please contact [facility], our nurse care line that is available for 24/7 assistance.     [phone] (toll-free)    Registered nurses in the [facility] provide clinical advisement, health education, appointment booking, and other advisory services.                    ** Verify the list of medication(s) below is accurate and up to  date. Carry this with you in case of emergency. If your medications have changed, please notify your healthcare provider.             Medication List      START taking these medications        Additional Info                      ibuprofen 600 MG tablet   Commonly known as:  ADVIL,MOTRIN   Quantity:  30 tablet   Refills:  2   Dose:  600 mg    Last time this was given:  600 mg on 1/18/2017  6:30 AM   Instructions:  Take 1 tablet (600 mg total) by mouth every 6 (six) hours.     Begin Date    AM    Noon    PM    Bedtime       oxycodone-acetaminophen 5-325 mg per tablet   Commonly known as:  PERCOCET   Quantity:  10 tablet   Refills:  0   Dose:  1 tablet    Last time this was given:  1 tablet on 1/17/2017 12:00 AM   Instructions:  Take 1 tablet by mouth every 4 (four) hours as needed.     Begin Date    AM    Noon    PM    Bedtime         CONTINUE taking these medications        Additional Info                      PNV 29-1 ORAL   Refills:  0   Dose:  1 tablet    Instructions:  Take 1 tablet by mouth once daily.     Begin Date    AM    Noon    PM    Bedtime       promethazine 25 MG tablet   Commonly known as:  PHENERGAN   Quantity:  30 tablet   Refills:  6   Dose:  25 mg    Instructions:  Take 1 tablet (25 mg total) by mouth every 4 (four) hours.     Begin Date    AM    Noon    PM    Bedtime            Where to Get Your Medications      These medications were sent to [facility] [address] [place] [address] AT [address] [address]     Phone:  [phone]     ibuprofen 600 MG tablet         You can get these medications from any pharmacy     Bring a paper prescription for each of these medications     oxycodone-acetaminophen 5-325 mg per tablet                  Please bring to all follow up appointments:    1. A copy of your discharge instructions.  2. All medicines you are currently taking in their original bottles.  3. Identification and insurance  "card.    Please arrive 15 minutes ahead of scheduled appointment time.    Please call 24 hours in advance if you must reschedule your appointment and/or time.        Follow-up Information     Follow up with Vanessa Davenport MD In 6 weeks.    Specialties:  Obstetrics, Obstetrics and Gynecology    Why:  Post-partum check-up    Contact information:    [address] [facility] [address]  [facility] [address]  [phone]          Discharge Instructions     Future Orders    Activity as tolerated     Call MD for:  difficulty breathing or increased cough     Call MD for:  increased confusion or weakness     Call MD for:  persistent dizziness, light-headedness, or visual disturbances     Call MD for:  persistent nausea and vomiting or diarrhea     Call MD for:  severe persistent headache     Call MD for:  severe uncontrolled pain     Call MD for:  temperature >100.4     Call MD for:  worsening rash     Diet general     Questions:    Total calories:      Fat restriction, if any:      Protein restriction, if any:      Na restriction, if any:      Fluid restriction:      Additional restrictions:          Primary Diagnosis     Your primary diagnosis was:  Abnormal Uterine Contraction      Admission Information     Date & Time Provider Department CSN    1/16/2017 12:00 PM Vanessa Davenport MD [facility] [phone]      Care Providers     Provider Role Specialty Primary office phone    Vanessa Davenport MD Attending Provider Obstetrics [phone]    Josefa Pena MD Consulting Physician  Obstetrics and Gynecology [phone]      Your Vitals Were     BP Pulse Temp Resp Height Weight    111/71 85 98.5 °F (36.9 °C) (Oral) 18 5' 4" (1.626 m) 73.9 kg (163 lb)    Last Period SpO2 BMI          04/15/2016 96% 27.98 kg/m2        Recent Lab Values     No lab values to display.      Allergies as of 1/18/2017        Reactions    Watermelon Shortness Of Breath, Itching, Swelling, Edema    Chest pain, angioedema, SOB, after eating " watermelon tonight      Advance Directives     An advance directive is a document which, in the event you are no longer able to make decisions for yourself, tells your healthcare team what kind of treatment you do or do not want to receive, or who you would like to make those decisions for you.  If you do not currently have an advance directive, [facility] encourages you to create one.  For more information call:  [phone]), [phone] ([phone]),  or log on to [email or web address].        Language Assistance Services     ATTENTION: Language assistance services are available, free of charge. Please call [phone].      ATENCIÓN: Si stefanola america, tiene a polk disposición servicios gratuitos de asistencia lingüística. Llame al [phone].     CHÚ Ý: N?u b?n nói Ti?ng Vi?t, có các d?ch v? h? tr? ngôn ng? mi?n phí dành cho b?n. G?i s? [phone].         [facility] complies with applicable Federal civil rights laws and does not discriminate on the basis of race, color, national origin, age, disability, or sex.

## 2017-01-16 NOTE — IP AVS SNAPSHOT
[facility] Location ([facility]) [address] [facility] [address]  Phone: [phone]           I have received a copy of my After Visit Summary and discharge instructions from [facility].    INSTRUCTIONS RECEIVED AND UNDERSTOOD BY:                     Patient/Patient Representative: ________________________________________________________________     Date/Time: ________________________________________________________________                     Instructions Given By: ________________________________________________________________     Date/Time: ________________________________________________________________

## 2017-01-16 NOTE — ED PROVIDER NOTES
Encounter Date: 2017       History     Chief Complaint   Patient presents with    Contractions     Review of patient's allergies indicates:   Allergen Reactions    Watermelon Shortness Of Breath, Itching, Swelling and Edema     Chest pain, angioedema, SOB, after eating watermelon tonight     HPI Comments: Juanita Lipscomb is a 21 y.o. [id number] at 38w1d presents complaining of of contractions every 2-3 minutes since this morning.     This IUP is complicated by cervical insufficiency requiring cerclage placement during this pregnancy.  Patient reports contractions, denies vaginal bleeding, denies LOF.   Fetal Movement: normal.      Past Medical History   Diagnosis Date    Incompetent cervix     Thyroid disease      Hypothyroidism     Past Medical History Pertinent Negatives   Diagnosis Date Noted    Asthma 2017    Diabetes mellitus 2017    Hypertension 2017     Past Surgical History   Procedure Laterality Date    Cervical cerclage       Family History   Problem Relation Age of Onset    Hypertension Mother     Breast cancer Neg Hx     Eclampsia Neg Hx      Social History   Substance Use Topics    Smoking status: Never Smoker    Smokeless tobacco: None    Alcohol use No     Review of Systems   Gastrointestinal: Positive for abdominal pain. Negative for diarrhea and nausea.   Genitourinary: Positive for pelvic pain and vaginal pain. Negative for vaginal bleeding and vaginal discharge.       Physical Exam   Initial Vitals   BP Pulse Resp Temp SpO2   17 1208 17 1208 17 1252 17 1252 17 1208   119/78 93 18 96.3 °F (35.7 °C) 100 %     Physical Exam    Constitutional: She appears well-developed and well-nourished. No distress.   Cardiovascular: Normal rate and regular rhythm.   Pulmonary/Chest: No respiratory distress.   Abdominal: Soft.   Musculoskeletal: Normal range of motion.   Neurological: She is alert and oriented to person, place, and time.     OB LABOR EXAM:        Method: Sterile vaginal exam per MD.       Dilatation: 4.   Station: -3.   Amniotic Fluid Color: no fluid.     Comments: FHT: reassuring, 130 bpm, moderate BTB variability, + accels, no decels  TOCO: q3min           ED Course   Procedures  Labs Reviewed - No data to display          Medical Decision Making:   Initial Assessment:   21 y.o. [id number] at 38w1d presents complaining of of contractions every 2-3 minutes  Differential Diagnosis:   Labor  ED Management:  Patient william and in pain on initial exam. SVE 4/80/-3. William every 2-3 minutes on the monitor.     Will admit to L&D for active labor.     Herman Andrew MD  PGY-1 OB/GYN  [phone]    Discussed plan with Dr. Munguia who was in agreement.                Attending Attestation:   Physician Attestation Statement for Resident:  As the supervising MD   Physician Attestation Statement: I have personally seen and examined this patient.   I agree with the above history. -:   As the supervising MD I agree with the above PE.    As the supervising MD I agree with the above treatment, course, plan, and disposition.   -: NST    Fetal heart tracing interpretation: reactive  Rate: 130  Variability: Moderate  Accelerations: present  Decelerations: absent  Category: I    Agree with decision to admit in active labor.  I was personally present during the critical portions of the procedure(s) performed by the resident and was immediately available in the ED to provide services and assistance as needed during the entire procedure.                    ED Course     Clinical Impression:   The encounter diagnosis was Irregular contractions.    Disposition:   Disposition: Admitted  Condition: Stable       Joaquin Andrew MD  Resident  [phone]       Brittany Munguia MD  17 [phone]

## 2017-01-16 NOTE — ED TRIAGE NOTES
Pt presents to OB ED for contractions every few minutes since 0800. Denies leakage of fluid or vaginal bleeding. States positive fetal movement.

## 2017-01-16 NOTE — ANESTHESIA PROCEDURE NOTES
Epidural    Patient location during procedure: OB   Reason for block: primary anesthetic   Diagnosis: IUP   Start time: 1/16/2017 1:25 PM  Timeout: 1/16/2017 1:25 PM  End time: 1/16/2017 1:32 PM  Staffing  Anesthesiologist: BERNA WALTON  Resident/CRNA: ANDREY DIAZ  Performed by: resident/CRNA   Preanesthetic Checklist  Completed: patient identified, site marked, surgical consent, pre-op evaluation, timeout performed, IV checked, risks and benefits discussed, monitors and equipment checked, anesthesia consent given, hand hygiene performed and patient being monitored  Preparation  Patient position: sitting  Prep: ChloraPrep  Patient monitoring: Pulse Ox and Blood Pressure  Epidural  Skin Anesthetic: lidocaine 1%  Skin Wheal: 3 mL  Administration type: single shot  Approach: midline  Interspace: L4-5  Injection technique: KRISTY saline  Needle and Epidural Catheter  Needle type: Tuohy   Needle gauge: 17  Needle length: 3.5 inches  Needle insertion depth: 7 cm  Catheter type: springwound and multi-orifice  Catheter size: 19 G  Catheter at skin depth: 11 cm  Test dose: 3 mL of lidocaine 1.5% with Epi 1-to-200,000  Additional Documentation: incremental injection, no paresthesia on injection, no signs/symptoms of IV or SA injection and no significant complaints from patient  Needle localization: anatomical landmarks  Medications:  Bolus administered: 10 mL of 0.125% bupivacaine  Volume per aspiration: 5 mL  Time between aspirations: 5 minutes  Assessment  Ease of block: easy  Patient's tolerance of the procedure: comfortable throughout block

## 2017-01-17 LAB
ANISOCYTOSIS BLD QL SMEAR: SLIGHT
BASOPHILS # BLD AUTO: 0.03 K/UL
BASOPHILS NFR BLD: 0.2 %
DIFFERENTIAL METHOD: ABNORMAL
EOSINOPHIL # BLD AUTO: 0.2 K/UL
EOSINOPHIL NFR BLD: 1.3 %
ERYTHROCYTE [DISTWIDTH] IN BLOOD BY AUTOMATED COUNT: 13.9 %
GIANT PLATELETS BLD QL SMEAR: PRESENT
HCT VFR BLD AUTO: 22.8 %
HGB BLD-MCNC: 7.5 G/DL
LYMPHOCYTES # BLD AUTO: 2.9 K/UL
LYMPHOCYTES NFR BLD: 19.8 %
MCH RBC QN AUTO: 24.9 PG
MCHC RBC AUTO-ENTMCNC: 32.9 %
MCV RBC AUTO: 76 FL
MONOCYTES # BLD AUTO: 1.3 K/UL
MONOCYTES NFR BLD: 8.5 %
NEUTROPHILS # BLD AUTO: 10.2 K/UL
NEUTROPHILS NFR BLD: 70.2 %
PLATELET # BLD AUTO: 135 K/UL
PLATELET BLD QL SMEAR: ABNORMAL
PMV BLD AUTO: 12.4 FL
RBC # BLD AUTO: 3.01 M/UL
WBC # BLD AUTO: 14.63 K/UL

## 2017-01-17 PROCEDURE — 25000003 PHARM REV CODE 250: Performed by: STUDENT IN AN ORGANIZED HEALTH CARE EDUCATION/TRAINING PROGRAM

## 2017-01-17 PROCEDURE — 25000003 PHARM REV CODE 250: Performed by: OBSTETRICS & GYNECOLOGY

## 2017-01-17 PROCEDURE — 36415 COLL VENOUS BLD VENIPUNCTURE: CPT

## 2017-01-17 PROCEDURE — 11000001 HC ACUTE MED/SURG PRIVATE ROOM

## 2017-01-17 PROCEDURE — 85025 COMPLETE CBC W/AUTO DIFF WBC: CPT

## 2017-01-17 RX ORDER — FERROUS SULFATE 325(65) MG
325 TABLET, DELAYED RELEASE (ENTERIC COATED) ORAL DAILY
Status: DISCONTINUED | OUTPATIENT
Start: 2017-01-17 | End: 2017-01-18 | Stop reason: HOSPADM

## 2017-01-17 RX ADMIN — IBUPROFEN 600 MG: 600 TABLET, FILM COATED ORAL at 06:01

## 2017-01-17 RX ADMIN — IBUPROFEN 600 MG: 600 TABLET, FILM COATED ORAL at 05:01

## 2017-01-17 RX ADMIN — DIPHENHYDRAMINE HYDROCHLORIDE 25 MG: 25 CAPSULE ORAL at 05:01

## 2017-01-17 RX ADMIN — OXYCODONE HYDROCHLORIDE AND ACETAMINOPHEN 1 TABLET: 10; 325 TABLET ORAL at 09:01

## 2017-01-17 RX ADMIN — DIPHENHYDRAMINE HYDROCHLORIDE 25 MG: 25 CAPSULE ORAL at 12:01

## 2017-01-17 RX ADMIN — OXYCODONE HYDROCHLORIDE AND ACETAMINOPHEN 1 TABLET: 10; 325 TABLET ORAL at 03:01

## 2017-01-17 RX ADMIN — FERROUS SULFATE TAB EC 324 MG (65 MG FE EQUIVALENT) 325 MG: 324 (65 FE) TABLET DELAYED RESPONSE at 09:01

## 2017-01-17 RX ADMIN — OXYCODONE AND ACETAMINOPHEN 1 TABLET: 5; 325 TABLET ORAL at 12:01

## 2017-01-17 RX ADMIN — IBUPROFEN 600 MG: 600 TABLET, FILM COATED ORAL at 12:01

## 2017-01-17 NOTE — MEDICAL/APP STUDENT
POSTPARTUM PROGRESS NOTE     Juanita Lipscomb is a 21 y.o. female  status post Vaginal Delivery at 38w2d in a pregnancy complicated by nil. Patient is doing well this morning. She denies nausea, vomiting, fever or chills.  Patient reports mild abdominal pain that is well relieved by oral pain medications. Lochia is mild and stable. Patient is voiding without difficulty and ambulating with no difficulty. She has passed flatus, and has had BM.  Patient does plan to breast feed. *** for contraception. She desires circumcision.    Objective:       Temp:  [96.3 °F (35.7 °C)-98.4 °F (36.9 °C)] 98.4 °F (36.9 °C)  Pulse:  [] 78  Resp:  [18] 18  SpO2:  [96 %-100 %] 98 %  BP: ()/(50-88) 110/53    General:   alert, appears stated age and cooperative   Lungs:   clear to auscultation bilaterally   Heart:   regular rate and rhythm, S1, S2 normal, no murmur, click, rub or gallop   Abdomen:  soft, non-tender; bowel sounds normal; no masses,  no organomegaly   Uterus:  medium located at the umblicus.        Incision: nil   Extremities: peripheral pulses normal, no pedal edema, no clubbing or cyanosis     Lab Review  Recent Results (from the past 4 hour(s))   CBC auto differential    Collection Time: 17  5:27 AM   Result Value Ref Range    WBC 14.63 (H) 3.90 - 12.70 K/uL    RBC 3.01 (L) 4.00 - 5.40 M/uL    Hemoglobin 7.5 (L) 12.0 - 16.0 g/dL    Hematocrit 22.8 (L) 37.0 - 48.5 %    MCV 76 (L) 82 - 98 fL    MCH 24.9 (L) 27.0 - 31.0 pg    MCHC 32.9 32.0 - 36.0 %    RDW 13.9 11.5 - 14.5 %    Platelets 135 (L) 150 - 350 K/uL    MPV 12.4 9.2 - 12.9 fL    Gran # 10.2 (H) 1.8 - 7.7 K/uL    Lymph # 2.9 1.0 - 4.8 K/uL    Mono # 1.3 (H) 0.3 - 1.0 K/uL    Eos # 0.2 0.0 - 0.5 K/uL    Baso # 0.03 0.00 - 0.20 K/uL    Gran% 70.2 38.0 - 73.0 %    Lymph% 19.8 18.0 - 48.0 %    Mono% 8.5 4.0 - 15.0 %    Eosinophil% 1.3 0.0 - 8.0 %    Basophil% 0.2 0.0 - 1.9 %    Platelet Estimate Decreased (A)     Aniso Slight     Large/Giant  Platelets Present     Differential Method Automated        I/O    Intake/Output Summary (Last 24 hours) at 17 0638  Last data filed at 17 1757   Gross per 24 hour   Intake          3623.16 ml   Output              875 ml   Net          2748.16 ml        Assessment:     Patient Active Problem List   Diagnosis    Supervision of high-risk pregnancy (bottle feed/mirena/flu done/TDAP ordered)    History of incompetent cervix, currently pregnant    Irregular contractions     (spontaneous vaginal delivery)        Plan:   1. Postpartum care:  - Patient doing well. Continue routine management and advances.  - Continue PO pain meds. Pain well controlled.  - Heme: H/h.  - Encourage ambulation  - Circumcision,  - Contraception  - Lactation     2.         Dispo: As patient meets milestones, will plan to discharge.    Everardo Urbina

## 2017-01-17 NOTE — ANESTHESIA POSTPROCEDURE EVALUATION
"Anesthesia Post Evaluation    Patient: Juanita Lipscomb    Procedure(s) Performed: * No procedures listed *    Final Anesthesia Type: epidural  Patient location during evaluation: floor  Patient participation: Yes- Able to Participate  Level of consciousness: awake and alert and oriented  Post-procedure vital signs: reviewed and stable  Pain management: adequate  Airway patency: patent  PONV status at discharge: No PONV  Anesthetic complications: no      Cardiovascular status: blood pressure returned to baseline and hemodynamically stable  Respiratory status: unassisted  Hydration status: euvolemic  Follow-up not needed.        Visit Vitals    /69    Pulse 69    Temp 36.8 °C (98.2 °F) (Oral)    Resp 18    Ht 5' 4" (1.626 m)    Wt 73.9 kg (163 lb)    LMP 04/15/2016    SpO2 100%    Breastfeeding Unknown    BMI 27.98 kg/m2       Pain/Dewayne Score: Pain Rating Prior to Med Admin: 8 (1/17/2017  3:14 PM)  Pain Rating Post Med Admin: 0 (1/17/2017 12:56 AM)      "

## 2017-01-17 NOTE — PROGRESS NOTES
POSTPARTUM PROGRESS NOTE     Juanita Lipscomb is a 21 y.o. female  status post Vaginal Delivery at 38w2d. Patient is doing well this morning. She denies nausea, vomiting, fever or chills.  Patient reports mild abdominal pain that is well relieved by oral pain medications. Lochia is mild and stable. Patient is voiding without difficulty and ambulating with no difficulty. She has passed flatus, and has had BM.  Patient does not plan to breast feed. Patch for contraception. She desires circumcision.    Objective:       Temp:  [96.3 °F (35.7 °C)-98.4 °F (36.9 °C)] 98.4 °F (36.9 °C)  Pulse:  [] 78  Resp:  [18] 18  SpO2:  [96 %-100 %] 98 %  BP: ()/(50-88) 110/53    General:   alert, appears stated age and cooperative   Lungs:   clear to auscultation bilaterally   Heart:   regular rate and rhythm, S1, S2 normal, no murmur, click, rub or gallop   Abdomen:  soft, non-tender; bowel sounds normal; no masses,  no organomegaly   Uterus:  medium located at the umblicus.    Extremities: peripheral pulses normal, no pedal edema, no clubbing or cyanosis     Lab Review  Recent Results (from the past 4 hour(s))   CBC auto differential    Collection Time: 17  5:27 AM   Result Value Ref Range    WBC 14.63 (H) 3.90 - 12.70 K/uL    RBC 3.01 (L) 4.00 - 5.40 M/uL    Hemoglobin 7.5 (L) 12.0 - 16.0 g/dL    Hematocrit 22.8 (L) 37.0 - 48.5 %    MCV 76 (L) 82 - 98 fL    MCH 24.9 (L) 27.0 - 31.0 pg    MCHC 32.9 32.0 - 36.0 %    RDW 13.9 11.5 - 14.5 %    Platelets 135 (L) 150 - 350 K/uL    MPV 12.4 9.2 - 12.9 fL    Gran # 10.2 (H) 1.8 - 7.7 K/uL    Lymph # 2.9 1.0 - 4.8 K/uL    Mono # 1.3 (H) 0.3 - 1.0 K/uL    Eos # 0.2 0.0 - 0.5 K/uL    Baso # 0.03 0.00 - 0.20 K/uL    Gran% 70.2 38.0 - 73.0 %    Lymph% 19.8 18.0 - 48.0 %    Mono% 8.5 4.0 - 15.0 %    Eosinophil% 1.3 0.0 - 8.0 %    Basophil% 0.2 0.0 - 1.9 %    Platelet Estimate Decreased (A)     Aniso Slight     Large/Giant Platelets Present     Differential Method Automated         I/O    Intake/Output Summary (Last 24 hours) at 17 0702  Last data filed at 17 1757   Gross per 24 hour   Intake          3623.16 ml   Output              875 ml   Net          2748.16 ml        Assessment:     Patient Active Problem List   Diagnosis    Supervision of high-risk pregnancy (bottle feed/mirena/flu done/TDAP ordered)    History of incompetent cervix, currently pregnant    Irregular contractions     (spontaneous vaginal delivery)        Plan:   1. Postpartum care:  - Patient doing well. Continue routine management and advances.  - Continue PO pain meds. Pain well controlled.  - Heme: H/h 9. > 7.. Vital signs stable  - Encourage ambulation  - Circumcision,  - Contraception  - Lactation     2. ABLA  - H/h . > 7.  - VSS and within normal limits  - Patient asymptomatic when standing  - No indication for transfusion at this time  - Will start Fe        Dispo: As patient meets milestones, will plan to discharge PPD #2.    Joaquin Andrew

## 2017-01-18 VITALS
RESPIRATION RATE: 18 BRPM | DIASTOLIC BLOOD PRESSURE: 71 MMHG | HEART RATE: 85 BPM | BODY MASS INDEX: 27.83 KG/M2 | HEIGHT: 64 IN | TEMPERATURE: 99 F | WEIGHT: 163 LBS | SYSTOLIC BLOOD PRESSURE: 111 MMHG | OXYGEN SATURATION: 96 %

## 2017-01-18 PROCEDURE — 63600175 PHARM REV CODE 636 W HCPCS: Performed by: OBSTETRICS & GYNECOLOGY

## 2017-01-18 PROCEDURE — 90471 IMMUNIZATION ADMIN: CPT | Performed by: OBSTETRICS & GYNECOLOGY

## 2017-01-18 PROCEDURE — 25000003 PHARM REV CODE 250: Performed by: OBSTETRICS & GYNECOLOGY

## 2017-01-18 PROCEDURE — 25000003 PHARM REV CODE 250: Performed by: ADVANCED PRACTICE MIDWIFE

## 2017-01-18 PROCEDURE — 90715 TDAP VACCINE 7 YRS/> IM: CPT | Performed by: OBSTETRICS & GYNECOLOGY

## 2017-01-18 PROCEDURE — 25000003 PHARM REV CODE 250: Performed by: STUDENT IN AN ORGANIZED HEALTH CARE EDUCATION/TRAINING PROGRAM

## 2017-01-18 RX ORDER — DOCUSATE SODIUM 100 MG/1
100 CAPSULE, LIQUID FILLED ORAL DAILY
Status: DISCONTINUED | OUTPATIENT
Start: 2017-01-18 | End: 2017-01-18 | Stop reason: HOSPADM

## 2017-01-18 RX ORDER — OXYCODONE AND ACETAMINOPHEN 5; 325 MG/1; MG/1
1 TABLET ORAL EVERY 4 HOURS PRN
Qty: 10 TABLET | Refills: 0 | Status: SHIPPED | OUTPATIENT
Start: 2017-01-18 | End: 2019-10-10

## 2017-01-18 RX ORDER — IBUPROFEN 600 MG/1
600 TABLET ORAL EVERY 6 HOURS
Qty: 30 TABLET | Refills: 2 | Status: SHIPPED | OUTPATIENT
Start: 2017-01-18 | End: 2019-10-10

## 2017-01-18 RX ADMIN — IBUPROFEN 600 MG: 600 TABLET, FILM COATED ORAL at 06:01

## 2017-01-18 RX ADMIN — CLOSTRIDIUM TETANI TOXOID ANTIGEN (FORMALDEHYDE INACTIVATED), CORYNEBACTERIUM DIPHTHERIAE TOXOID ANTIGEN (FORMALDEHYDE INACTIVATED), BORDETELLA PERTUSSIS TOXOID ANTIGEN (GLUTARALDEHYDE INACTIVATED), BORDETELLA PERTUSSIS FILAMENTOUS HEMAGGLUTININ ANTIGEN (FORMALDEHYDE INACTIVATED), BORDETELLA PERTUSSIS PERTACTIN ANTIGEN, AND BORDETELLA PERTUSSIS FIMBRIAE 2/3 ANTIGEN 0.5 ML: 5; 2; 2.5; 5; 3; 5 INJECTION, SUSPENSION INTRAMUSCULAR at 10:01

## 2017-01-18 RX ADMIN — DOCUSATE SODIUM 100 MG: 100 CAPSULE, LIQUID FILLED ORAL at 09:01

## 2017-01-18 RX ADMIN — DIPHENHYDRAMINE HYDROCHLORIDE 25 MG: 25 CAPSULE ORAL at 09:01

## 2017-01-18 RX ADMIN — OXYCODONE HYDROCHLORIDE AND ACETAMINOPHEN 1 TABLET: 10; 325 TABLET ORAL at 12:01

## 2017-01-18 RX ADMIN — FERROUS SULFATE TAB EC 324 MG (65 MG FE EQUIVALENT) 325 MG: 324 (65 FE) TABLET DELAYED RESPONSE at 09:01

## 2017-01-18 RX ADMIN — IBUPROFEN 600 MG: 600 TABLET, FILM COATED ORAL at 12:01

## 2017-01-18 RX ADMIN — OXYCODONE HYDROCHLORIDE AND ACETAMINOPHEN 1 TABLET: 10; 325 TABLET ORAL at 04:01

## 2017-01-18 NOTE — DISCHARGE SUMMARY
Delivery Discharge Summary  Obstetrics      Primary OB Clinician: GABBY Davenport    Admission date: 2017  Discharge date: 2017    Disposition: To home, self care    Admit Dx:      Patient Active Problem List   Diagnosis    Supervision of high-risk pregnancy (bottle feed/mirena/flu done/TDAP ordered)    History of incompetent cervix, currently pregnant    Irregular contractions     (spontaneous vaginal delivery)     Discharge Dx:    Patient Active Problem List   Diagnosis    Supervision of high-risk pregnancy (bottle feed/mirena/flu done/TDAP ordered)    History of incompetent cervix, currently pregnant    Irregular contractions     (spontaneous vaginal delivery)     Procedure:     Hospital Course:  Juanita Lipscomb is a 21 y.o. now , PPD #2 who was admitted on 2017 at 38w1d for labor. On initial assessment, vital signs were stable and physical exam was normal. Infant was in cephalic presentation. Patient was subsequently admitted to labor and delivery unit with signed consents. Labor course was managed expectantly.  Patient delivered a single viable  male. Please see delivery note for further details. Pt was in stable condition post delivery and was transferred to the Mother-Baby Unit. Her postpartum course was uncomplicated. On discharge day, patient's pain is controlled with oral pain medications. Pt is tolerating ambulation without SOB or CP, and PO diet without N/V. Reports lochia is mild. Denies any HA, vision changes, F/C, LE swelling. Denies any breast pain/soreness.  Pt in stable condition and ready for discharge. She has been instructed to continue pain medications as needed and to follow up in the OB clinic in 6 weeks with her obstetrics provider.    Pertinent studies:  Postpartum CBC  Lab Results   Component Value Date    WBC 14.63 (H) 2017    HGB 7.5 (L) 2017    HCT 22.8 (L) 2017    MCV 76 (L) 2017     (L) 2017     Tubal Ligation:  n/a  Feeding Method: breast  Rh Immune Globulin Given(B POS): N/A  Rubella Vaccine Given: N/A - immune  Tdap Vaccine Given: last utd 09; ordered to be given at discharge    Delivery:    Episiotomy: None   Lacerations: None   Repair suture:     Repair # of packets: 0   Blood loss (ml): 175     Birth information:  YOB: 2019   Time of birth: 3:09 PM   Sex: male   Delivery type: Vaginal, Spontaneous Delivery   Gestational Age: 38w1d    Delivery Clinician:      Other providers:       Additional  information:  Forceps:    Vacuum:    Breech:    Observed anomalies      Living?:           APGARS  One minute Five minutes Ten minutes   Skin color:         Heart rate:         Grimace:         Muscle tone:         Breathing:         Totals: 9  9        Placenta: Delivered:       appearance      Patient Instructions:   Current Discharge Medication List      START taking these medications    Details   ibuprofen (ADVIL,MOTRIN) 600 MG tablet Take 1 tablet (600 mg total) by mouth every 6 (six) hours.  Qty: 30 tablet, Refills: 2    Associated Diagnoses: Irregular contractions;  (spontaneous vaginal delivery)      oxycodone-acetaminophen (PERCOCET) 5-325 mg per tablet Take 1 tablet by mouth every 4 (four) hours as needed.  Qty: 10 tablet, Refills: 0    Associated Diagnoses: Irregular contractions;  (spontaneous vaginal delivery)         CONTINUE these medications which have NOT CHANGED    Details   PRENATAL VIT #76/IRON,CARB/FA (PNV 29-1 ORAL) Take 1 tablet by mouth once daily.      promethazine (PHENERGAN) 25 MG tablet Take 1 tablet (25 mg total) by mouth every 4 (four) hours.  Qty: 30 tablet, Refills: 6    Associated Diagnoses: Nausea/vomiting in pregnancy             Discharge Procedure Orders  Diet general     Activity as tolerated     Call MD for:  increased confusion or weakness     Call MD for:  worsening rash     Call MD for:  severe persistent headache     Call MD for:  difficulty breathing or  increased cough     Call MD for:  severe uncontrolled pain     Call MD for:  persistent nausea and vomiting or diarrhea     Call MD for:  temperature >100.4     Call MD for:  persistent dizziness, light-headedness, or visual disturbances       Follow-up Information     Follow up with Vanessa Davenport MD In 6 weeks.    Specialties:  Obstetrics, Obstetrics and Gynecology    Why:  Post-partum check-up    Contact information:    [address] [address]  [phone]          Diane Arellano MD, PhD  OBGYN, PGY-1

## 2017-01-18 NOTE — PROGRESS NOTES
POSTPARTUM PROGRESS NOTE     Juanita Lipscomb is a 21 y.o. female  PPD #2 status post Vaginal Delivery at 38w2d.     Patient is doing well this morning. She denies nausea, vomiting, fever or chills. Patient reports mild abdominal pain that is well relieved by oral pain medications. Lochia is mild and stable. Patient is voiding without difficulty and ambulating with no difficulty. She has passed flatus, and has had BM.  Patient does not plan to breast feed. Patch for contraception. Circumcision completed.    Objective:       Temp:  [97.8 °F (36.6 °C)-98.2 °F (36.8 °C)] 97.9 °F (36.6 °C)  Pulse:  [69-77] 77  Resp:  [16-18] 16  SpO2:  [96 %-100 %] 96 %  BP: (104-112)/(60-69) 109/61    General:   alert, appears stated age and cooperative   Lungs:   clear to auscultation bilaterally   Heart:   regular rate and rhythm, S1, S2 normal, no murmur, click, rub or gallop   Abdomen:  soft, non-tender; bowel sounds normal; no masses,  no organomegaly   Uterus:  firm located at the umblicus.    Extremities: peripheral pulses normal, no pedal edema, no clubbing or cyanosis     Lab Review  No results found for this or any previous visit (from the past 4 hour(s)).    I/O    Intake/Output Summary (Last 24 hours) at 17 0643  Last data filed at 17 1700   Gross per 24 hour   Intake              600 ml   Output              700 ml   Net             -100 ml        Assessment:     Patient Active Problem List   Diagnosis    Supervision of high-risk pregnancy (bottle feed/mirena/flu done/TDAP ordered)    History of incompetent cervix, currently pregnant    Irregular contractions     (spontaneous vaginal delivery)        Plan:   1. Postpartum care:  - Patient doing well. Continue routine management and advances.  - Continue PO pain meds. Pain well controlled.  - Heme:     Recent Labs  Lab 17  1242 17  0527   WBC 9.71 14.63*   HGB 9.1* 7.5*   HCT 27.1* 22.8*   MCV 76* 76*   * 135*      - Encourage  ambulation  - Circumcision completed  - Contraception not discussed  - Lactation consult PRN    2. ABLA  - H/h 9.7/27 > 7.5/22  - VSS and within normal limits  - Patient remains asymptomatic  - No indication for transfusion at this time  - Continue Fe, colace      Dispo: As patient meets milestones, will plan to discharge today, PPD #2.    Diane Arellano MD, PhD  OBGYN, PGY-1

## 2017-01-19 LAB — POCT GLUCOSE: 85 MG/DL (ref 70–110)

## 2018-06-20 ENCOUNTER — CLINICAL SUPPORT (OUTPATIENT)
Dept: OCCUPATIONAL MEDICINE | Facility: CLINIC | Age: 23
End: 2018-06-20

## 2018-06-20 DIAGNOSIS — Z02.1 PRE-EMPLOYMENT DRUG SCREENING: Primary | ICD-10-CM

## 2018-11-12 PROBLEM — O47.9 IRREGULAR CONTRACTIONS: Status: RESOLVED | Noted: 2017-01-16 | Resolved: 2018-11-12

## 2018-11-30 NOTE — ED AVS SNAPSHOT
[facility]  [address]  [place] [address]               Juanita Lipscomb   1/10/2017  8:24 AM   ED    Description:  Female : 1995   Department:  [facility]           Your Care was Coordinated By:     Provider Role From To    Hailee Lange MD Attending Provider 01/10/17 0844 --      Reason for Visit     Contractions           Diagnoses this Visit        Comments    Irregular uterine contractions    -  Primary     Nausea/vomiting in pregnancy         37 weeks gestation of pregnancy           ED Disposition     ED Disposition Condition Comment    Discharge             To Do List           [facility]     [facility] -  Assistance  Registered nurses in the [facility] provide clinical advisement, health education, appointment booking, and other advisory services.  Call for this free service at [phone].             Medications           Message regarding Medications     Verify the changes and/or additions to your medication regime listed below are the same as discussed with your clinician today.  If any of these changes or additions are incorrect, please notify your healthcare provider.             Verify that the below list of medications is an accurate representation of the medications you are currently taking.  If none reported, the list may be blank. If incorrect, please contact your healthcare provider. Carry this list with you in case of emergency.           Current Medications     PRENATAL VIT #76/IRON,CARB/FA (PNV 29-1 ORAL) Take 1 tablet by mouth once daily.    promethazine (PHENERGAN) 25 MG tablet Take 1 tablet (25 mg total) by mouth every 4 (four) hours.           Clinical Reference Information           Your Vitals Were     BP Pulse Temp Last Period SpO2       110/74 74 97.5 °F (36.4 °C) 04/15/2016 87%       Allergies as of 1/10/2017        Reactions    Watermelon Shortness Of Breath, Itching,  DATE OF PROCEDURE:  09/19/2018      SURGEON:  Otto Gr M.D.      PREOPERATIVE DIAGNOSES:  History of left breast cancer, status post left breast   reconstruction after mastectomy, ptosis of the right breast and asymmetry.       POSTOPERATIVE DIAGNOSES:  History of left breast cancer, status post left   breast reconstruction after mastectomy, ptosis of the right breast and   asymmetry.       PROCEDURE PERFORMED:  Right breast mastopexy.      CLINICAL HISTORY:  This was 63-year-old black female who had undergone left   mastectomy and breast reconstruction in the past.  She also had ptosis of the   right breast.  The patient desired to have a correction done.  Physical   examination revealed status post left mastectomy and the left breast   reconstruction with the implant and nipple-areolar reconstruction and right   breast ptosis, status post right breast reduction scars.       SURGEON:  Otto Gr M.D.      ASSISTANT:  Allen Thornton MD      DESCRIPTION OF PROCEDURE:  The patient was placed on operating table in sitting   position.  Appropriate skin markings were next carried out.  She was placed in   supine position.  General anesthesia was given by anesthesiologist.  Both   breast regions were prepped and draped.  Right breast was infiltrated with   0.25% xylocaine with 1:200,000 epinephrine solution.  Then, skin incision was   made over the previously marked breast.  The central portion was   deepithelialized.  Medial and lateral flaps were next developed and both medial   and lateral flaps were next brought down to the center portion and then closure   was performed using interrupted 2-0 Vicryl for the deep subcutaneous tissue,   interrupted and running 2-0 Vicryl for vertical limb of the mastopexy incision.    Remaining closure was performed using 2-0 PDO for inframammary incision and   the skin was approximated using running 3-0 Monoderm suture.  Vertical limb was   approximated using interrupted  3-0 Vicryl and skin was approximated using   running 4-0 Monocryl.  Previously marked nipple area was next incised and the   skin was deepithelialized and then was opened as a stan-cross fashion through   the dermis and the nipple-areolar complex was delivered through there and then   closure was performed to surrounding skin using interrupted 4-0 PDS and   interrupted 4-0 Prolene.  Steri-Strip and sterile dressing was next applied.   In between, the patient was placed in sitting position and symmetry was checked   and was found to be satisfactory.          _____________________ ____________________________________   DATE AND TIME Otto Gr M.D.         ETHEL/HIEN-#[id number]   DD:  09/19/2018 12:22:45      DT:  09/19/2018 20:12:51             [facility]      VIELKA, WANDY Â     [id number]                                ROOM: MARTÍNEZ SOLIZ                                 [facility]#: [id number]   REPORT OF OPERATION                                                                                  Swelling, Edema    Chest pain, angioedema, SOB, after eating watermelon tonight      Immunizations Administered on Date of Encounter - 1/10/2017     None      ED Micro, Lab, POCT     Start Ordered       Status Ordering Provider    01/10/17 0923 01/10/17 0922  POCT urinalysis, dipstick or tablet reag  Once      Final result       ED Imaging Orders     None        Discharge Instructions       Call clinic [phone] or L & D after hours at [phone] for vaginal bleeding, leakage of fluids, regular contractions every 5 mins for 2 hours, decreased fetal movements ( 10 kicks in 2 hours), headache not relieved by Tylenol, blurry vision, or temp of 100.4 or greater.  Begin doing fetal kick counts, at least 10 movements in 2 hours starting at 28 weeks gestation.  Keep next clinic appointment      Discharge References/Attachments     LABOR, RECOGNIZING (ENGLISH)      Your Scheduled Appointments     Jan 12, 2017  1:00 PM CST   Routine Prenatal Visit with Summer Núñez MD   [facility]    [address] [facility] [id number] [phone]               [facility] complies with applicable Federal civil rights laws and does not discriminate on the basis of race, color, national origin, age, disability, or sex.        Language Assistance Services     ATTENTION: Language assistance services are available, free of charge. Please call [phone].      ATENCIÓN: Si habla español, tiene a polk disposición servicios gratuitos de asistencia lingüística. Llame al [phone].     CHÚ Ý: N?u b?n nói Ti?ng Vi?t, có các d?ch v? h? tr? ngôn ng? mi?n phí dành cho b?n. G?i s? [phone].

## 2019-08-19 ENCOUNTER — TELEPHONE (OUTPATIENT)
Dept: FAMILY MEDICINE | Facility: CLINIC | Age: 24
End: 2019-08-19

## 2019-08-19 NOTE — TELEPHONE ENCOUNTER
----- Message from Vandana Stafford LPN sent at 8/7/2019 11:12 AM CDT -----  Contact: pATIENT  Pt has medicaid.  Pt access in [place] sent msg.  ----- Message -----  From: Radha Padilla  Sent: 8/7/2019  11:00 AM  To: Anya Salmeron Staff    Needs Advice    Reason for call: Patient would like to set up appt for a check up        Communication Preference: [phone]    Additional Information: Patient use to see her at the [facility]

## 2019-08-19 NOTE — TELEPHONE ENCOUNTER
----- Message from Vandana Stafford LPN sent at 8/7/2019 11:12 AM CDT -----  Contact: pATIENT  Pt has medicaid.  Pt access in [place] sent msg.  ----- Message -----  From: Radha Padilla  Sent: 8/7/2019  11:00 AM  To: Anya Salmeron Staff    Needs Advice    Reason for call: Patient would like to set up appt for a check up        Communication Preference: [phone]    Additional Information: Patient use to see her at the [facility]

## 2019-10-02 ENCOUNTER — HOSPITAL ENCOUNTER (EMERGENCY)
Facility: OTHER | Age: 24
Discharge: HOME | End: 2019-10-02
Attending: OBSTETRICS & GYNECOLOGY
Payer: MEDICAID

## 2019-10-02 VITALS
RESPIRATION RATE: 18 BRPM | HEART RATE: 86 BPM | OXYGEN SATURATION: 99 % | SYSTOLIC BLOOD PRESSURE: 105 MMHG | DIASTOLIC BLOOD PRESSURE: 66 MMHG | TEMPERATURE: 99 F

## 2019-10-02 DIAGNOSIS — G44.209 ACUTE NON INTRACTABLE TENSION-TYPE HEADACHE: ICD-10-CM

## 2019-10-02 DIAGNOSIS — R10.9 CRAMPING AFFECTING PREGNANCY, ANTEPARTUM: Primary | ICD-10-CM

## 2019-10-02 DIAGNOSIS — O26.899 CRAMPING AFFECTING PREGNANCY, ANTEPARTUM: Primary | ICD-10-CM

## 2019-10-02 PROCEDURE — 63600175 PHARM REV CODE 636 W HCPCS: Performed by: STUDENT IN AN ORGANIZED HEALTH CARE EDUCATION/TRAINING PROGRAM

## 2019-10-02 PROCEDURE — 99284 PR EMERGENCY DEPT VISIT,LEVEL IV: ICD-10-PCS | Mod: 24,,, | Performed by: OBSTETRICS & GYNECOLOGY

## 2019-10-02 PROCEDURE — 99284 EMERGENCY DEPT VISIT MOD MDM: CPT | Mod: 24,,, | Performed by: OBSTETRICS & GYNECOLOGY

## 2019-10-02 PROCEDURE — 99284 EMERGENCY DEPT VISIT MOD MDM: CPT

## 2019-10-02 RX ORDER — PROCHLORPERAZINE MALEATE 5 MG
10 TABLET ORAL ONCE
Status: COMPLETED | OUTPATIENT
Start: 2019-10-02 | End: 2019-10-02

## 2019-10-02 RX ADMIN — PROCHLORPERAZINE MALEATE 10 MG: 5 TABLET ORAL at 07:10

## 2019-10-02 NOTE — ED PROVIDER NOTES
Encounter Date: 10/2/2019       History     Chief Complaint   Patient presents with    Abdominal Pain    Headache     Juanita Lipscomb is a 24 y.o. [id number] at Unknown gestational age who presents complaining of abdominal pain and headache. Pt found out she was pregnant three days ago via a home pregnancy test. She does not use protection and just checks her pregnancy status every few weeks. She states her last period was 3 years ago. Pt saw Dr. Davenport in her first three pregnancies and was told to come into the ED with her pain.    Her abdominal pain started 2 days ago, it is suprapubic in nature. Denies any urinary symptoms such as burning with urinary, increased frequency or urgency. Denies vaginal discharge.     Her headache has been on and off for 4 weeks.  She has been taking tylenol 1 g every 4 hours for the pain for awhile.    This IUP is complicated by history of incompetent cervix with cerclage placed, no prenatal care for this pregnancy yet.  Patient denies contractions, denies vaginal bleeding, denies LOF.   Fetal Movement: normal.          Review of patient's allergies indicates:   Allergen Reactions    Watermelon Shortness Of Breath, Itching, Swelling and Edema     Chest pain, angioedema, SOB, after eating watermelon tonight     Past Medical History:   Diagnosis Date    Incompetent cervix     Thyroid disease     Hypothyroidism     Past Surgical History:   Procedure Laterality Date    CERVICAL CERCLAGE       Family History   Problem Relation Age of Onset    Hypertension Mother     Breast cancer Neg Hx     Eclampsia Neg Hx      Social History     Tobacco Use    Smoking status: Never Smoker   Substance Use Topics    Alcohol use: No    Drug use: No     Review of Systems   Constitutional: Negative for chills and fever.   HENT: Negative for sore throat.    Eyes: Negative for visual disturbance.   Respiratory: Negative for shortness of breath.    Cardiovascular: Negative for chest pain.    Gastrointestinal: Positive for abdominal pain. Negative for nausea and vomiting.   Genitourinary: Negative for dysuria, vaginal bleeding and vaginal discharge.   Musculoskeletal: Negative for back pain.   Skin: Negative for rash.   Neurological: Positive for headaches. Negative for dizziness and weakness.   Hematological: Does not bruise/bleed easily.       Physical Exam     Initial Vitals [10/02/19 1843]   BP Pulse Resp Temp SpO2   105/66 96 18 98.5 °F (36.9 °C) 100 %      MAP       --         Physical Exam    Vitals reviewed.  Constitutional: Vital signs are normal. She appears well-developed and well-nourished. Breathing: Normal.   HENT:   Head: Normocephalic and atraumatic.   Eyes: EOM are normal.   Neck: Normal range of motion.   Cardiovascular: Normal rate, intact distal pulses and normal pulses.   Pulmonary/Chest: No respiratory distress.   Abdominal: Soft. She exhibits no distension. There is no tenderness. There is no rebound and no guarding.   Genitourinary: Uterus normal.   Musculoskeletal: She exhibits no edema or tenderness.   Neurological: She is alert and oriented to person, place, and time. She has normal strength and normal reflexes.   Skin: Skin is warm and dry.   Psychiatric: She has a normal mood and affect. Her behavior is normal.     OB LABOR EXAM:   Pre-Term Labor: No.   Membranes ruptured: No.   Method: Sterile vaginal exam per MD.       Dilatation: 0.   Station: -3.   Effacement: 40%.   Amniotic Fluid Color: no fluid.           ED Course   Procedures  Labs Reviewed - No data to display       Imaging Results    None          Medical Decision Making:   History:   Old Medical Records: I decided to obtain old medical records.  Old Records Summarized: records from clinic visits.  ED Management:  VSS  Pregnancy test positive  Headache decreased with compazine 10 mg  Bedside ultrasound by attending Dr Wallis showed gestational sac and yolk sac in the uterus, estimated gestational age about 5-6  weeks pregnant  Counseled about normal discomforts of pregnancy that can occur in the first trimester.   Pt has follow up appointment to establish care for this pregnancy with Dr. Davenport next week  Pt encouraged to take prenatal vitamins daily and given basic pregnancy precautions  Other:   I have discussed this case with another health care provider.       <> Summary of the Discussion: Dr. Wallis              Attending Attestation:   Physician Attestation Statement for Resident:  As the supervising MD   Physician Attestation Statement: I have personally seen and examined this patient.   I agree with the above history. -:   As the supervising MD I agree with the above PE.    As the supervising MD I agree with the above treatment, course, plan, and disposition.   -: Patient evaluated and found to be stable, agree with resident's assessment and plan.  I was personally present during the entire procedure.  I have reviewed the following: old records at this facility.                    ED Course as of Oct 03 0416   Wed Oct 02, 2019   1934 Bedside u/s shows early GS within the uterus and a yolk sac, 5-6 weeks pregnant. Patient reassured and encouraged to take PNV. Precautions given for when to return to the ED.  Plans follow up with Dr. Davenport    [AR]      ED Course User Index  [AR] Randee Wallis MD     Clinical Impression:       ICD-10-CM ICD-9-CM   1. Cramping affecting pregnancy, antepartum O26.899 646.83    R10.9 789.00   2. Acute non intractable tension-type headache G44.209 339.10         Disposition:   Disposition: Discharged  Condition: Stable       Radha Loera MD  OBGYN PGY-1                   Sarah Loera MD  Resident  10/03/19 0418       Rnadee Wallsi MD  10/06/19 3515

## 2019-10-03 NOTE — DISCHARGE INSTRUCTIONS
Call clinic [phone] or L & D after hours at [phone] for vaginal bleeding, leakage of fluids, regular contractions every 5 mins for 2 hours, decreased fetal movements ( 10 kicks in 2 hours), headache not relieved by Tylenol, blurry vision, or temp of 100.4 or greater.  Begin doing fetal kick counts, at least 10 movements in 2 hours starting at 28 weeks gestation.  Keep next clinic appointment            Abdominal Pain and Early Pregnancy    (To rule out ectopic pregnancy or miscarriage)  Our tests show that you are pregnant, but the exact cause of your pain isnt clear.  Some pain and bleeding are common early in pregnancy. Often they stop, and you can go on to have a normal pregnancy and baby. Other times the pain or bleeding can be signs of a miscarriage or ectopic pregnancy. An ectopic pregnancy is a very serious problem. At this time it is unclear if your pregnancy will continue normally, if you will have a miscarriage, or if you could have an ectopic pregnancy. Below is some information about this.  Miscarriage  At this time we dont know whether you will have a miscarriage, or if things will clear up and your pregnancy will continue normally. We understand that this is emotionally difficult. There is little we can say to change the way you feel. But understand that miscarriages are common.  About 1 or 2 out of every 10 pregnancies end this way. Some end even before you know you are pregnant. This happens for a number of reasons, and usually we never figure out why. Its important you know that it is not your fault. It didnt happen because you did anything wrong.  Having sex or exercising does not cause a miscarriage. These activities are usually safe unless you have pain or bleeding or your doctor tells you to stop. Even minor falls wont cause a miscarriage. Miscarriages happen because things were not developing as they were supposed to. No medicine can prevent a miscarriage.  Ectopic pregnancy  In a normal  pregnancy, the fertilized egg attaches to the wall of the womb (uterus). In an ectopic or tubal pregnancy, the fertilized egg attaches outside the uterus, usually in the fallopian tube. Very rarely, the egg attaches to an ovary or somewhere else in the abdomen. An ectopic pregnancy is much less common than a miscarriage, but it is very serious. The baby cannot survive, and as it grows it can rupture the tube. This can cause internal bleeding and even death. Risk factors for an ectopic are:  · An ectopic pregnancy in the past  · Pelvic inflammatory disease, or PID  · Endometriosis  · Smoking  · An IUD  Additional tests  Because we dont know whats causing your symptoms, you will need more tests to figure out what the problem is. You may need:  Ultrasound  An ultrasound can usually find a normal pregnancy as early as 4 to 5 weeks along. If the ultrasound does not show the baby inside the uterus, it means that:  · You have a normal pregnancy less than 4 weeks along, or  · You are having or recently had a miscarriage, or  · You have an ectopic pregnancy  Quantitative HCG  This test measures the amount of a pregnancy hormone in your blood. Comparing today's test result to a repeat test in 2 days will show whether you have a normal pregnancy.  Laparoscopy  This is a type of surgery. The healthcare provider will put a tube with a light inside your belly (abdomen) to look directly at your pelvic organs. This test is used when it is not safe to wait 2 days for blood test results.  Important information  If you do have an ectopic pregnancy, there is a small chance that the growing fetus can tear the fallopian tube. This can cause severe internal bleeding. If this happens, you may have:  · Sudden severe pain in your lower abdomen  · Vaginal bleeding  · Weakness, dizziness, and sometimes fainting  If any of these symptoms occur:  · Call 911 or return immediately to the hospital.  · Do not drive yourself.  · Do not go to your  healthcare provider's office or to a clinic. Go to the hospital.   Home care  Follow these guidelines to help care for yourself at home:  · Rest until your next exam. Dont do anything strenuous.  · Eat a light diet with foods that are easy to digest.  · Dont have sex until your healthcare provider says its OK.  Follow-up care  Follow up with your healthcare provider, or as advised. If you were told to have a repeat blood test in 2 days, its important to get it done.  If you had an X-ray or ultrasound, a radiologist will review it. You will be told of any new findings that may affect your care.  Call 911  Call 911 if you have any of these:  · Severe pain and very heavy bleeding  · Severe lightheadedness, passing out, or fainting  · Rapid heart rate  · Trouble breathing  · Confused or difficulty waking up  When to seek medical advice  Call your healthcare provider right away if any of these occur:  · The pain in your abdomen gets worse, either suddenly or gradually.  · You are dizzy or weak when you stand.  · You have heavy vaginal bleeding. This means soaking 1 pad an hour for 3 hours.  · You have vaginal bleeding for more than 5 days.  · You have repeated vomiting or diarrhea.  · The pain in your abdomen moves to the lower right.  · You have blood in your vomit or bowel movements. This will be dark red or black.  · You have a fever of 100.4ºF (38ºC) or higher, or as directed by your healthcare provider  Date Last Reviewed: 10/1/2016  © 0562-7949 [employer]. [address] [place] [address], [place] [address]. All rights reserved. This information is not intended as a substitute for professional medical care. Always follow your healthcare professional's instructions.        Adapting to Pregnancy: First Trimester  As your body adjusts, you may have to change or limit your daily activities. Youll need more rest. You may also need to use the energy you have more wisely.     Eat stomach-friendly foods like  cottage cheese, crackers, or bread throughout the day.   Your changing body  Almost every part of your body is affected as you adapt to pregnancy. The uterus and cervix will begin to soften right away. You may not look very pregnant during the first 3 months. But you are likely to have some common signs of early pregnancy:  · Nausea  · Fatigue  · Frequent urination  · Mood swings  · Bloating of the abdomen  · Missed or light periods (first trimester bleeding)  · Nipple or breast tenderness, breast swelling  Its not too late to start good habits  What matters most is protecting your baby from this moment on. If you smoke, drink alcohol, or use drugs, now is the time to stop. If you need help, talk with your healthcare provider.  · Smoking increases the risk of  stillbirth or having a low-birth-weight baby. If you smoke, quit now.  · Alcohol and drugs have been linked with miscarriage, birth defects, intellectual disability, and low birth weight. Do not drink alcohol or take drugs.  Tips to relieve nausea  Although nausea can happen at any time of the day, it may be worse in the morning. To help prevent nausea:  · Eat small, light meals at frequent intervals.  · Get up slowly. Eat a few unsalted crackers before you get out of bed.  · Avoid smells that bother you.  · Avoid spicy and fatty foods.  · Eat an ice pop in your favorite flavor.  · Get plenty of rest.  · Ask your healthcare provider about taking osmar or vitamin B6 for nausea and vomiting.  · Talk with your healthcare provider if you take vitamins that upset your stomach.  Work concerns  The end of the first trimester is a good time to discuss working during pregnancy with your employer. Follow your healthcare providers advice if your job requires you to stand for a long time, work with hazardous tools, or even sit at a desk all day. Your workspace, workload, or scheduled hours may need to be adjusted. Perhaps you can change body postures more often or take  an extra break.  Advice for travel  Talk to your healthcare provider first, but the second trimester may be the best time for any travel. You may be advised to avoid certain trips while youre pregnant. Food and water can be concerns in developing countries. Travel by car is a good choice, as you can stop, get out, and stretch. Bring snacks and water along. Fasten the lap belt below your belly, low over your hips. Also be sure to wear the shoulder harness.  Intimacy  Unless your healthcare provider tells you to, there is no reason to stop having sex while youre pregnant. You or your partner may notice changes in desire. Desire may be less in the first trimester, due to nausea and fatigue. In the second trimester, sex may be very enjoyable. The third trimester can be a challenge comfort-wise. Try different positions and see whats best for you both.  Date Last Reviewed: 8/16/2015  © 6524-3323 [employer]. [address] [place] [address], [place] [address]. All rights reserved. This information is not intended as a substitute for professional medical care. Always follow your healthcare professional's instructions.

## 2019-10-10 ENCOUNTER — OFFICE VISIT (OUTPATIENT)
Dept: OBSTETRICS AND GYNECOLOGY | Facility: CLINIC | Age: 24
End: 2019-10-10
Payer: MEDICAID

## 2019-10-10 ENCOUNTER — APPOINTMENT (OUTPATIENT)
Dept: LAB | Facility: HOSPITAL | Age: 24
End: 2019-10-10
Attending: STUDENT IN AN ORGANIZED HEALTH CARE EDUCATION/TRAINING PROGRAM
Payer: MEDICAID

## 2019-10-10 VITALS
SYSTOLIC BLOOD PRESSURE: 100 MMHG | BODY MASS INDEX: 26.34 KG/M2 | HEIGHT: 64 IN | DIASTOLIC BLOOD PRESSURE: 62 MMHG | WEIGHT: 154.31 LBS

## 2019-10-10 DIAGNOSIS — O21.9 NAUSEA/VOMITING IN PREGNANCY: ICD-10-CM

## 2019-10-10 DIAGNOSIS — N91.2 AMENORRHEA: Primary | ICD-10-CM

## 2019-10-10 LAB
ABO + RH BLD: NORMAL
B-HCG UR QL: POSITIVE
BASOPHILS # BLD AUTO: 0.04 K/UL (ref 0–0.2)
BASOPHILS NFR BLD: 0.5 % (ref 0–1.9)
BLD GP AB SCN CELLS X3 SERPL QL: NORMAL
C TRACH DNA SPEC QL NAA+PROBE: NOT DETECTED
CTP QC/QA: YES
DIFFERENTIAL METHOD: ABNORMAL
EOSINOPHIL # BLD AUTO: 0.3 K/UL (ref 0–0.5)
EOSINOPHIL NFR BLD: 3 % (ref 0–8)
ERYTHROCYTE [DISTWIDTH] IN BLOOD BY AUTOMATED COUNT: 13.2 % (ref 11.5–14.5)
HCT VFR BLD AUTO: 35.1 % (ref 37–48.5)
HGB BLD-MCNC: 11.2 G/DL (ref 12–16)
IMM GRANULOCYTES # BLD AUTO: 0.05 K/UL (ref 0–0.04)
IMM GRANULOCYTES NFR BLD AUTO: 0.6 % (ref 0–0.5)
LYMPHOCYTES # BLD AUTO: 1.9 K/UL (ref 1–4.8)
LYMPHOCYTES NFR BLD: 21.2 % (ref 18–48)
MCH RBC QN AUTO: 28.7 PG (ref 27–31)
MCHC RBC AUTO-ENTMCNC: 31.9 G/DL (ref 32–36)
MCV RBC AUTO: 90 FL (ref 82–98)
MONOCYTES # BLD AUTO: 0.7 K/UL (ref 0.3–1)
MONOCYTES NFR BLD: 7.6 % (ref 4–15)
N GONORRHOEA DNA SPEC QL NAA+PROBE: NOT DETECTED
NEUTROPHILS # BLD AUTO: 5.9 K/UL (ref 1.8–7.7)
NEUTROPHILS NFR BLD: 67.1 % (ref 38–73)
NRBC BLD-RTO: 0 /100 WBC
PLATELET # BLD AUTO: 179 K/UL (ref 150–350)
PMV BLD AUTO: 12.3 FL (ref 9.2–12.9)
RBC # BLD AUTO: 3.9 M/UL (ref 4–5.4)
TSH SERPL DL<=0.005 MIU/L-ACNC: 2.48 UIU/ML (ref 0.4–4)
WBC # BLD AUTO: 8.79 K/UL (ref 3.9–12.7)

## 2019-10-10 PROCEDURE — 99999 PR PBB SHADOW E&M-EST. PATIENT-LVL III: CPT | Mod: PBBFAC,,, | Performed by: STUDENT IN AN ORGANIZED HEALTH CARE EDUCATION/TRAINING PROGRAM

## 2019-10-10 PROCEDURE — 85025 COMPLETE CBC W/AUTO DIFF WBC: CPT

## 2019-10-10 PROCEDURE — 99202 PR OFFICE/OUTPT VISIT, NEW, LEVL II, 15-29 MIN: ICD-10-PCS | Mod: S$PBB,TH,, | Performed by: STUDENT IN AN ORGANIZED HEALTH CARE EDUCATION/TRAINING PROGRAM

## 2019-10-10 PROCEDURE — 99213 OFFICE O/P EST LOW 20 MIN: CPT | Mod: PBBFAC,TH,PO,25 | Performed by: STUDENT IN AN ORGANIZED HEALTH CARE EDUCATION/TRAINING PROGRAM

## 2019-10-10 PROCEDURE — 87491 CHLMYD TRACH DNA AMP PROBE: CPT

## 2019-10-10 PROCEDURE — 99202 OFFICE O/P NEW SF 15 MIN: CPT | Mod: S$PBB,TH,, | Performed by: STUDENT IN AN ORGANIZED HEALTH CARE EDUCATION/TRAINING PROGRAM

## 2019-10-10 PROCEDURE — 86901 BLOOD TYPING SEROLOGIC RH(D): CPT

## 2019-10-10 PROCEDURE — 86703 HIV-1/HIV-2 1 RESULT ANTBDY: CPT

## 2019-10-10 PROCEDURE — 86592 SYPHILIS TEST NON-TREP QUAL: CPT

## 2019-10-10 PROCEDURE — 86762 RUBELLA ANTIBODY: CPT

## 2019-10-10 PROCEDURE — 87088 URINE BACTERIA CULTURE: CPT

## 2019-10-10 PROCEDURE — 84443 ASSAY THYROID STIM HORMONE: CPT

## 2019-10-10 PROCEDURE — 83021 HEMOGLOBIN CHROMOTOGRAPHY: CPT

## 2019-10-10 PROCEDURE — 81025 URINE PREGNANCY TEST: CPT | Mod: PBBFAC,PO | Performed by: STUDENT IN AN ORGANIZED HEALTH CARE EDUCATION/TRAINING PROGRAM

## 2019-10-10 PROCEDURE — 87086 URINE CULTURE/COLONY COUNT: CPT

## 2019-10-10 PROCEDURE — 83020 HEMOGLOBIN ELECTROPHORESIS: CPT | Mod: 91

## 2019-10-10 PROCEDURE — 87340 HEPATITIS B SURFACE AG IA: CPT

## 2019-10-10 PROCEDURE — 99999 PR PBB SHADOW E&M-EST. PATIENT-LVL III: ICD-10-PCS | Mod: PBBFAC,,, | Performed by: STUDENT IN AN ORGANIZED HEALTH CARE EDUCATION/TRAINING PROGRAM

## 2019-10-10 RX ORDER — ONDANSETRON 8 MG/1
8 TABLET, ORALLY DISINTEGRATING ORAL EVERY 6 HOURS PRN
Qty: 12 TABLET | Refills: 3 | Status: SHIPPED | OUTPATIENT
Start: 2019-10-10 | End: 2020-03-12 | Stop reason: SDUPTHER

## 2019-10-10 NOTE — PROGRESS NOTES
History & Physical      SUBJECTIVE:     Chief Complaint: Routine Prenatal Visit (pt states she hasn't had a cycle in 3years )       History of Present Illness: Pt is a 24 y.o.  who presents with complaint of amenorrhea. Pt has not had a cycle for the past 3 years. Reports that she intermittently takes pregnancy tests at home and had a positive home pregnancy test this past week. Was seen in the [facility] with complaint of abdominal pain and headache. Bedside US with gestational sac and yolk sac, measuring approx 5-6 weeks. Today, pt has complaints of daily nausea/vomiting. Is able to tolerate PO. Denies abdominal cramping, VB, vaginal discharge.    Ob history  G1:  @ 37 weeks, pt reports emergency cerclage  G2: SVE @ 38 weeks, scheduled cerclage   G3:  @ 38 weeks, scheduled cerclage     Gyn history  Denies h/o STDs  Reports pap with [facility] in the last year  Denies history of abnormal pap smears      Review of patient's allergies indicates:   Allergen Reactions    Watermelon Shortness Of Breath, Itching, Swelling and Edema     Chest pain, angioedema, SOB, after eating watermelon tonight       Past Medical History:   Diagnosis Date    Incompetent cervix     Rh incompatibility     Thyroid disease     Hypothyroidism     Past Surgical History:   Procedure Laterality Date    CERVICAL CERCLAGE       OB History        3    Para   3    Term   3       0    AB   0    Living   3       SAB   0    TAB   0    Ectopic   0    Multiple   0    Live Births   3               Family History   Problem Relation Age of Onset    Hypertension Mother     Breast cancer Neg Hx     Eclampsia Neg Hx      Social History     Tobacco Use    Smoking status: Never Smoker   Substance Use Topics    Alcohol use: No    Drug use: No       Current Outpatient Medications   Medication Sig    ondansetron (ZOFRAN-ODT) 8 MG TbDL Take 1 tablet (8 mg total) by mouth every 6 (six) hours as needed.    PRENATAL VIT  #76/IRON,CARB/FA (PNV 29-1 ORAL) Take 1 tablet by mouth once daily.    prenatal92-iron-folate8-ps-dha (ENBRACE HR) 1.5 mg iron- 8.73 mg-6.4 mg CpID Take 1 tablet by mouth once daily.     No current facility-administered medications for this visit.          Review of Systems:  ROS:  GENERAL: No fever, chills, fatigability or weight loss.  VULVAR: No pain, no lesions and no itching.  VAGINAL: No relaxation, no itching, no discharge, no abnormal bleeding and no lesions.  ABDOMEN: No abdominal pain. Denies nausea. Denies vomiting. No diarrhea. No constipation  URINARY: No incontinence, no nocturia, no frequency and no dysuria.  CARDIOVASCULAR: No chest pain.   PULMONARY: No shortness of breath.   NEUROLOGICAL: No headaches. No vision changes.       OBJECTIVE:     Vitals:    10/10/19 1311   BP: 100/62       PHYSICAL EXAM:  GENERAL: vitals reviewed and normal; no acute distress  NEURO: AAOx3  PSYC: normal mood and affect  PULM: normal resp effort  CARDIO: RRR  ABD: nontender, no masses, no hepatosplenomegaly  VULVA: normal appearing vulva with no masses, tenderness or lesions   URETHRA: normal  URETHRAL MEATUS: normal  BLADDER: nontender  VAGINA: normal appearing vagina with normal color. No lesions. Minimal white discharge  CERVIX: normal appearing cervix without discharge or lesions  UTERUS: uterus is normal size, shape, consistency and nontender   ADNEXA: normal adnexa in size, nontender and no masses        ASSESSMENT:     Juanita Lipscomb came in to clinic today for amenorrhea, positive home pregnancy test         Plan:      1. Amenorrhea  - POCT Urine Pregnancy  - prenatal92-iron-folate8-ps-dha (ENBRACE HR) 1.5 mg iron- 8.73 mg-6.4 mg CpID; Take 1 tablet by mouth once daily.  Dispense: 30 each; Refill: 11  - C. trachomatis/N. gonorrhoeae by AMP DNA  - CBC auto differential  - Hemoglobin Electrophoresis,Hgb A2 Fredy.  - Hepatitis B surface antigen  - HIV 1/2 Ag/Ab (4th Gen)  - US OB/GYN Procedure ([facility]); Future  -  RPR  - Rubella antibody, IgG  - Type & Screen  - Urine culture  - TSH  - Counseled to avoid cat litter, not garden without gloves, avoid raw meat, heat up deli meat, to eat large fish like tuna no more than once a week, and to avoid soft unpasteurized cheeses.  I recommend a PNV daily.  She should avoid ibuprofen.    2. Nausea/vomiting in pregnancy  - ondansetron (ZOFRAN-ODT) 8 MG TbDL; Take 1 tablet (8 mg total) by mouth every 6 (six) hours as needed.  Dispense: 12 tablet; Refill: 3  - counseled on use of ginger 250mg TID    [facility] contacted, no record of pap on file; NEEDS PAP AT NEXT VISIT    RTC 4 weeks    Lali Grissom MD   OBGYN, PGY-2

## 2019-10-11 LAB
BACTERIA UR CULT: ABNORMAL
HBV SURFACE AG SERPL QL IA: NEGATIVE
HGB A2 MFR BLD HPLC: 2.9 % (ref 2.2–3.2)
HGB FRACT BLD ELPH-IMP: NORMAL
HGB FRACT BLD ELPH-IMP: NORMAL
HIV 1+2 AB+HIV1 P24 AG SERPL QL IA: NEGATIVE
RPR SER QL: NORMAL
RUBV IGG SER-ACNC: 21.2 IU/ML
RUBV IGG SER-IMP: REACTIVE

## 2019-10-14 PROBLEM — D57.3 SICKLE CELL TRAIT: Status: ACTIVE | Noted: 2019-10-14

## 2019-10-15 ENCOUNTER — PROCEDURE VISIT (OUTPATIENT)
Dept: OBSTETRICS AND GYNECOLOGY | Facility: CLINIC | Age: 24
End: 2019-10-15
Payer: MEDICAID

## 2019-10-15 DIAGNOSIS — Z36.89 ESTABLISH GESTATIONAL AGE, ULTRASOUND: ICD-10-CM

## 2019-10-15 DIAGNOSIS — N91.2 AMENORRHEA: ICD-10-CM

## 2019-10-15 LAB — HGB FRACT BLD ELPH PH6.0-IMP: NORMAL

## 2019-10-15 PROCEDURE — 76801 PR US, OB <14WKS, TRANSABD, SINGLE GESTATION: ICD-10-PCS | Mod: 26,S$PBB,, | Performed by: OBSTETRICS & GYNECOLOGY

## 2019-10-15 PROCEDURE — 76801 OB US < 14 WKS SINGLE FETUS: CPT | Mod: PBBFAC | Performed by: OBSTETRICS & GYNECOLOGY

## 2019-10-15 PROCEDURE — 76801 OB US < 14 WKS SINGLE FETUS: CPT | Mod: 26,S$PBB,, | Performed by: OBSTETRICS & GYNECOLOGY

## 2019-10-28 ENCOUNTER — TELEPHONE (OUTPATIENT)
Dept: OBSTETRICS AND GYNECOLOGY | Facility: HOSPITAL | Age: 24
End: 2019-10-28

## 2019-10-28 ENCOUNTER — HOSPITAL ENCOUNTER (EMERGENCY)
Facility: OTHER | Age: 24
Discharge: HOME | End: 2019-10-28
Attending: OBSTETRICS & GYNECOLOGY
Payer: MEDICAID

## 2019-10-28 VITALS — RESPIRATION RATE: 16 BRPM | SYSTOLIC BLOOD PRESSURE: 103 MMHG | DIASTOLIC BLOOD PRESSURE: 63 MMHG | HEART RATE: 93 BPM

## 2019-10-28 DIAGNOSIS — Z3A.10 10 WEEKS GESTATION OF PREGNANCY: ICD-10-CM

## 2019-10-28 DIAGNOSIS — O09.291 H/O INCOMPETENT CERVIX, CURRENTLY PREGNANT, FIRST TRIMESTER: ICD-10-CM

## 2019-10-28 DIAGNOSIS — O21.9 NAUSEA/VOMITING IN PREGNANCY: Primary | ICD-10-CM

## 2019-10-28 LAB
BACTERIA #/AREA URNS HPF: ABNORMAL /HPF
BILIRUB UR QL STRIP: NEGATIVE
CLARITY UR: ABNORMAL
COLOR UR: YELLOW
GLUCOSE UR QL STRIP: NEGATIVE
HGB UR QL STRIP: NEGATIVE
KETONES UR QL STRIP: NEGATIVE
LEUKOCYTE ESTERASE UR QL STRIP: ABNORMAL
MICROSCOPIC COMMENT: ABNORMAL
NITRITE UR QL STRIP: NEGATIVE
PH UR STRIP: 6 [PH] (ref 5–8)
PROT UR QL STRIP: NEGATIVE
SP GR UR STRIP: 1.01 (ref 1–1.03)
URN SPEC COLLECT METH UR: ABNORMAL
UROBILINOGEN UR STRIP-ACNC: NEGATIVE EU/DL
WBC #/AREA URNS HPF: 27 /HPF (ref 0–5)
WBC CLUMPS URNS QL MICRO: ABNORMAL

## 2019-10-28 PROCEDURE — 99284 EMERGENCY DEPT VISIT MOD MDM: CPT | Mod: ,,, | Performed by: OBSTETRICS & GYNECOLOGY

## 2019-10-28 PROCEDURE — 87086 URINE CULTURE/COLONY COUNT: CPT

## 2019-10-28 PROCEDURE — 87088 URINE BACTERIA CULTURE: CPT

## 2019-10-28 PROCEDURE — 81000 URINALYSIS NONAUTO W/SCOPE: CPT

## 2019-10-28 PROCEDURE — 99283 EMERGENCY DEPT VISIT LOW MDM: CPT

## 2019-10-28 PROCEDURE — 99284 PR EMERGENCY DEPT VISIT,LEVEL IV: ICD-10-PCS | Mod: ,,, | Performed by: OBSTETRICS & GYNECOLOGY

## 2019-10-28 PROCEDURE — 63600175 PHARM REV CODE 636 W HCPCS: Performed by: STUDENT IN AN ORGANIZED HEALTH CARE EDUCATION/TRAINING PROGRAM

## 2019-10-28 RX ORDER — PROCHLORPERAZINE MALEATE 10 MG
10 TABLET ORAL EVERY 6 HOURS PRN
Qty: 15 TABLET | Refills: 0 | Status: SHIPPED | OUTPATIENT
Start: 2019-10-28 | End: 2019-11-07 | Stop reason: SDUPTHER

## 2019-10-28 RX ORDER — PROCHLORPERAZINE MALEATE 5 MG
10 TABLET ORAL ONCE
Status: COMPLETED | OUTPATIENT
Start: 2019-10-28 | End: 2019-10-28

## 2019-10-28 RX ADMIN — PROCHLORPERAZINE MALEATE 10 MG: 5 TABLET ORAL at 07:10

## 2019-10-28 NOTE — TELEPHONE ENCOUNTER
----- Message from Jimena Holliday LPN sent at 10/28/2019 10:25 AM CDT -----      ----- Message -----  From: Abdoulaye Camacho  Sent: 10/28/2019  10:12 AM CDT  To: [facility] Staff    10wks ob pt states that she can not keep anything down. Pt can be reached at [phone].

## 2019-10-29 NOTE — PROGRESS NOTES
Pt presented to [facility] c/o vomiting, and inability to keep food down. States that prescribed zofran does not work. Dr. Alves notified. Compazine ordered. Will continue to monitor.

## 2019-10-29 NOTE — ED PROVIDER NOTES
Encounter Date: 10/28/2019       History     Chief Complaint   Patient presents with    Hyperemesis Gravidarum     23 yo  with no prenatal care to date but approximately 10 weeks by a bedside US in the OB ED presents c/o persistent vomiting today. She is unable to tolerate liquids or solids for the day. She denies fever, diarrhea or sick contacts.  Patient has a history of cervical insufficiency and plans a cerclage which she hopes to book next week. Previous care at [facility].        Review of patient's allergies indicates:   Allergen Reactions    Watermelon Shortness Of Breath, Itching, Swelling and Edema     Chest pain, angioedema, SOB, after eating watermelon tonight     Past Medical History:   Diagnosis Date    Incompetent cervix     Rh incompatibility     Sickle cell trait     Thyroid disease     Hypothyroidism     Past Surgical History:   Procedure Laterality Date    CERVICAL CERCLAGE       Family History   Problem Relation Age of Onset    Hypertension Mother     Breast cancer Neg Hx     Eclampsia Neg Hx      Social History     Tobacco Use    Smoking status: Never Smoker   Substance Use Topics    Alcohol use: No    Drug use: No     Review of Systems   Constitutional: Negative for fever.   HENT: Negative for sore throat.    Respiratory: Negative for shortness of breath.    Cardiovascular: Negative for chest pain.   Gastrointestinal: Positive for abdominal pain, nausea and vomiting.   Genitourinary: Negative for dysuria and vaginal bleeding.   Musculoskeletal: Negative for back pain.   Skin: Negative for rash.   Neurological: Negative for weakness.   Hematological: Does not bruise/bleed easily.       Physical Exam     Initial Vitals [10/28/19 1940]   BP Pulse Resp Temp SpO2   103/63 93 16 -- --      MAP       --         Physical Exam    Vitals reviewed.  Constitutional: She appears well-developed and well-nourished. No distress.   HENT:   Head: Normocephalic and atraumatic.   Cardiovascular: Normal  rate and regular rhythm.   Pulmonary/Chest: No respiratory distress.   Abdominal: Soft.   Neurological: She is alert and oriented to person, place, and time.   Psychiatric: She has a normal mood and affect.         ED Course   Procedures  Labs Reviewed   URINALYSIS, REFLEX TO URINE CULTURE - Abnormal; Notable for the following components:       Result Value    Appearance, UA Hazy (*)     Leukocytes, UA 1+ (*)     All other components within normal limits    Narrative:     Preferred Collection Type->Urine, Clean Catch   URINALYSIS MICROSCOPIC - Abnormal; Notable for the following components:    WBC, UA 27 (*)     WBC Clumps, UA Few (*)     Bacteria Few (*)     All other components within normal limits    Narrative:     Preferred Collection Type->Urine, Clean Catch   CULTURE, URINE          Imaging Results    None          Medical Decision Making:   Initial Assessment:   23 yo  10 weeks with hyperemesis resolved with PO compazine, now able to tolerate PO fluids. She defers an IV.  -discharge to home   -compazine given for home use if needed  -will [facility] at [facility] to [facility] for cerclage, patient is aware of the risk of  delivery                      Clinical Impression:       ICD-10-CM ICD-9-CM   1. Nausea/vomiting in pregnancy O21.9 643.90   2. 10 weeks gestation of pregnancy Z3A.10 V22.2   3. H/O incompetent cervix, currently pregnant, first trimester O09.291 V23.49                                Brittany Munguia MD  10/28/19 4935

## 2019-10-30 LAB — BACTERIA UR CULT: ABNORMAL

## 2019-10-31 ENCOUNTER — TELEPHONE (OUTPATIENT)
Dept: EMERGENCY MEDICINE | Facility: OTHER | Age: 24
End: 2019-10-31

## 2019-10-31 RX ORDER — NITROFURANTOIN 25; 75 MG/1; MG/1
100 CAPSULE ORAL 2 TIMES DAILY
Qty: 14 CAPSULE | Refills: 0 | Status: SHIPPED | OUTPATIENT
Start: 2019-10-31 | End: 2019-11-07

## 2019-10-31 NOTE — TELEPHONE ENCOUNTER
Called patient to inform her of UTI. Patient states that she is feeling well with no s/s UTI. Informed of urine culture that shows staph >100,000. Will send Macrobid to her pharmacy to take BID for 7 days. Plan DENISA in clinic next Thursday.

## 2019-11-07 ENCOUNTER — ROUTINE PRENATAL (OUTPATIENT)
Dept: OBSTETRICS AND GYNECOLOGY | Facility: CLINIC | Age: 24
End: 2019-11-07
Payer: MEDICAID

## 2019-11-07 VITALS
BODY MASS INDEX: 26.57 KG/M2 | WEIGHT: 154.75 LBS | SYSTOLIC BLOOD PRESSURE: 110 MMHG | DIASTOLIC BLOOD PRESSURE: 74 MMHG

## 2019-11-07 DIAGNOSIS — O09.299 H/O INCOMPETENT CERVIX, CURRENTLY PREGNANT: ICD-10-CM

## 2019-11-07 DIAGNOSIS — O09.91 SUPERVISION OF HIGH RISK PREGNANCY IN FIRST TRIMESTER: ICD-10-CM

## 2019-11-07 PROBLEM — Z34.90 SUPERVISION OF NORMAL PREGNANCY: Status: ACTIVE | Noted: 2019-11-07

## 2019-11-07 PROBLEM — O09.90 PREGNANCY, SUPERVISION, HIGH-RISK: Status: ACTIVE | Noted: 2019-11-07

## 2019-11-07 PROCEDURE — 99213 PR OFFICE/OUTPT VISIT, EST, LEVL III, 20-29 MIN: ICD-10-PCS | Mod: TH,S$PBB,, | Performed by: OBSTETRICS & GYNECOLOGY

## 2019-11-07 PROCEDURE — 99213 OFFICE O/P EST LOW 20 MIN: CPT | Mod: PBBFAC,TH,PO | Performed by: OBSTETRICS & GYNECOLOGY

## 2019-11-07 PROCEDURE — 87086 URINE CULTURE/COLONY COUNT: CPT

## 2019-11-07 PROCEDURE — 99999 PR PBB SHADOW E&M-EST. PATIENT-LVL III: ICD-10-PCS | Mod: PBBFAC,,, | Performed by: OBSTETRICS & GYNECOLOGY

## 2019-11-07 PROCEDURE — 99999 PR PBB SHADOW E&M-EST. PATIENT-LVL III: CPT | Mod: PBBFAC,,, | Performed by: OBSTETRICS & GYNECOLOGY

## 2019-11-07 PROCEDURE — 99213 OFFICE O/P EST LOW 20 MIN: CPT | Mod: TH,S$PBB,, | Performed by: OBSTETRICS & GYNECOLOGY

## 2019-11-07 PROCEDURE — 88175 CYTOPATH C/V AUTO FLUID REDO: CPT

## 2019-11-07 RX ORDER — PROCHLORPERAZINE MALEATE 10 MG
10 TABLET ORAL EVERY 6 HOURS PRN
Qty: 15 TABLET | Refills: 2 | Status: SHIPPED | OUTPATIENT
Start: 2019-11-07 | End: 2020-02-11

## 2019-11-07 NOTE — PROGRESS NOTES
Complaints today: Continues having n/v, relieved with compazine. Otherwise doing well.     /74   Wt 70.2 kg (154 lb 12.2 oz)   BMI 26.57 kg/m²     24 y.o., at 11w2d by Estimated Date of Delivery: 20  Patient Active Problem List   Diagnosis    Sickle cell trait    Pregnancy, supervision, high-risk/no flu    H/O incompetent cervix, currently pregnant     OB History    Para Term  AB Living   4 3 3 0 0 3   SAB TAB Ectopic Multiple Live Births   0 0 0 0 3      # Outcome Date GA Lbr Tom/2nd Weight Sex Delivery Anes PTL Lv   4 Current            3 Term 17 38w1d / 00:13 2.835 kg (6 lb 4 oz) M Vag-Spont EPI N BURT   2 Term 14 38w0d  3.402 kg (7 lb 8 oz) M Vag-Spont EPI N BURT   1 Term 11 37w0d  3.147 kg (6 lb 15 oz) M Vag-Spont EPI N BURT       Dating reviewed    Allergies and problem list reviewed and updated    Medical and surgical history reviewed    Prenatal labs reviewed and updated    Physical Exam:  ABD: soft, gravid, nontender      Assessment:  11w2d here for routine prenatal    Plan:   - declines genetics, flu  - MFM consult for h/o incompetent cervix  - pap  - repeat urine culture  - refill compazine, PNV

## 2019-11-07 NOTE — PROGRESS NOTES
Seen and examined.  Agree with note.  All questions answered  Juanita was seen today for initial prenatal visit.    Diagnoses and all orders for this visit:    Supervision of high risk pregnancy in first trimester  -     Urine culture  -     prochlorperazine (COMPAZINE) 10 MG tablet; Take 1 tablet (10 mg total) by mouth every 6 (six) hours as needed.  -     Ambulatory consult to Maternal Fetal Medicine  -     US MFM Procedure (Viewpoint); Future  -     Liquid-based pap smear, screening  -     prenatal92-iron-folate8-ps-dha (ENBRACE HR) 1.5 mg iron- 8.73 mg-6.4 mg CpID; Take 1 tablet by mouth once daily.    H/O incompetent cervix, currently pregnant  -     Ambulatory consult to Maternal Fetal Medicine  -     US MFM Procedure (Viewpoint); Future

## 2019-11-09 LAB
BACTERIA UR CULT: NORMAL
BACTERIA UR CULT: NORMAL

## 2019-11-11 ENCOUNTER — TELEPHONE (OUTPATIENT)
Dept: OBSTETRICS AND GYNECOLOGY | Facility: CLINIC | Age: 24
End: 2019-11-11

## 2019-11-11 NOTE — TELEPHONE ENCOUNTER
----- Message from Abdoulaye Camacho sent at 11/11/2019 10:18 AM CST -----  12wks ob pt states that she has yeast infection real bad. Pt can be reached at [phone].

## 2019-11-12 ENCOUNTER — TELEPHONE (OUTPATIENT)
Dept: OBSTETRICS AND GYNECOLOGY | Facility: CLINIC | Age: 24
End: 2019-11-12

## 2019-11-12 ENCOUNTER — ROUTINE PRENATAL (OUTPATIENT)
Dept: OBSTETRICS AND GYNECOLOGY | Facility: CLINIC | Age: 24
End: 2019-11-12
Payer: MEDICAID

## 2019-11-12 VITALS
DIASTOLIC BLOOD PRESSURE: 76 MMHG | WEIGHT: 162.25 LBS | SYSTOLIC BLOOD PRESSURE: 110 MMHG | BODY MASS INDEX: 27.85 KG/M2

## 2019-11-12 DIAGNOSIS — Z34.81 ENCOUNTER FOR SUPERVISION OF OTHER NORMAL PREGNANCY IN FIRST TRIMESTER: ICD-10-CM

## 2019-11-12 DIAGNOSIS — N76.1 SUBACUTE VAGINITIS: Primary | ICD-10-CM

## 2019-11-12 PROCEDURE — 99999 PR PBB SHADOW E&M-EST. PATIENT-LVL III: CPT | Mod: PBBFAC,,, | Performed by: ADVANCED PRACTICE MIDWIFE

## 2019-11-12 PROCEDURE — 99213 PR OFFICE/OUTPT VISIT, EST, LEVL III, 20-29 MIN: ICD-10-PCS | Mod: TH,S$PBB,, | Performed by: ADVANCED PRACTICE MIDWIFE

## 2019-11-12 PROCEDURE — 87801 DETECT AGNT MULT DNA AMPLI: CPT

## 2019-11-12 PROCEDURE — 99213 OFFICE O/P EST LOW 20 MIN: CPT | Mod: PBBFAC,TH,PO | Performed by: ADVANCED PRACTICE MIDWIFE

## 2019-11-12 PROCEDURE — 99213 OFFICE O/P EST LOW 20 MIN: CPT | Mod: TH,S$PBB,, | Performed by: ADVANCED PRACTICE MIDWIFE

## 2019-11-12 PROCEDURE — 87481 CANDIDA DNA AMP PROBE: CPT | Mod: 59

## 2019-11-12 PROCEDURE — 99999 PR PBB SHADOW E&M-EST. PATIENT-LVL III: ICD-10-PCS | Mod: PBBFAC,,, | Performed by: ADVANCED PRACTICE MIDWIFE

## 2019-11-12 NOTE — TELEPHONE ENCOUNTER
Returned call. Pt c/o vaginal itching and thick white vaginal discharge that looks like cottage cheese. Denies odor. Pt stated that she has tried Monistat 7 before and it did not work for her. Pt requested a Rx. Pharmacy info verified. Dr. Davenport notified.

## 2019-11-12 NOTE — TELEPHONE ENCOUNTER
----- Message from Abdoulaye Camacho sent at 11/12/2019  8:55 AM CST -----  Ob pt states that she has real bad yeast infection. Pt can be reached at [phone].

## 2019-11-12 NOTE — PROGRESS NOTES
Chief Complaint   Patient presents with    Routine Prenatal Visit       24 y.o., at 12w0d by Estimated Date of Delivery: 20    Complaints today: Reports vaginal discharge and itching    ROS  OBSTETRICS:   Contractions none   Bleeding none   Loss of fluid none   Fetal mvmnt no  GASTRO:   Nausea denies   Vomiting denies      OB History    Para Term  AB Living   4 3 3 0 0 3   SAB TAB Ectopic Multiple Live Births   0 0 0 0 3      # Outcome Date GA Lbr Tom/2nd Weight Sex Delivery Anes PTL Lv   4 Current            3 Term 17 38w1d / 00:13 2.835 kg (6 lb 4 oz) M Vag-Spont EPI N BURT   2 Term 14 38w0d  3.402 kg (7 lb 8 oz) M Vag-Spont EPI N BURT   1 Term 11 37w0d  3.147 kg (6 lb 15 oz) M Vag-Spont EPI N BURT       Dating reviewed  Allergies and problem list reviewed and updated  Medical and surgical history reviewed  Prenatal labs reviewed and updated    PHYSICAL EXAM  /76   Wt 73.6 kg (162 lb 4.1 oz)   BMI 27.85 kg/m²     GENERAL: No acute distress  HEENT: Normocephalic  NEURO: Alert and oriented x3  PSYCH: Normal mood and affect  PULMONARY: Non-labored respiration; no tachypnea  ABD: Soft, gravid, nontender; no hernia or hepatosplenomegaly    ASSESSMENT AND PLAN    pregnancy Problems (from 19 to present)     Problem Noted Resolved    Pregnancy, supervision, high-risk/no flu 2019 by Joaquin Andrew MD No            Discussed vaginitis- pt reports has used OTC Monistat 7 x 1 week but still with irritation and itching.   labor precautions given  Follow-up: 4 weeks

## 2019-11-13 LAB
BACTERIAL VAGINOSIS DNA: NEGATIVE
CANDIDA GLABRATA DNA: NEGATIVE
CANDIDA KRUSEI DNA: NEGATIVE
CANDIDA RRNA VAG QL PROBE: POSITIVE
T VAGINALIS RRNA GENITAL QL PROBE: NEGATIVE

## 2019-11-15 ENCOUNTER — PATIENT MESSAGE (OUTPATIENT)
Dept: OBSTETRICS AND GYNECOLOGY | Facility: CLINIC | Age: 24
End: 2019-11-15

## 2019-11-15 DIAGNOSIS — B37.9 CANDIDA ALBICANS INFECTION: Primary | ICD-10-CM

## 2019-11-15 RX ORDER — TERCONAZOLE 4 MG/G
1 CREAM VAGINAL DAILY
Qty: 45 G | Refills: 0 | Status: SHIPPED | OUTPATIENT
Start: 2019-11-15 | End: 2020-01-14

## 2019-11-18 ENCOUNTER — PROCEDURE VISIT (OUTPATIENT)
Dept: MATERNAL FETAL MEDICINE | Facility: CLINIC | Age: 24
End: 2019-11-18
Payer: MEDICAID

## 2019-11-18 ENCOUNTER — INITIAL CONSULT (OUTPATIENT)
Dept: MATERNAL FETAL MEDICINE | Facility: CLINIC | Age: 24
End: 2019-11-18
Attending: OBSTETRICS & GYNECOLOGY
Payer: MEDICAID

## 2019-11-18 VITALS
DIASTOLIC BLOOD PRESSURE: 70 MMHG | SYSTOLIC BLOOD PRESSURE: 110 MMHG | WEIGHT: 159.81 LBS | BODY MASS INDEX: 27.44 KG/M2

## 2019-11-18 DIAGNOSIS — O09.299 H/O INCOMPETENT CERVIX, CURRENTLY PREGNANT: ICD-10-CM

## 2019-11-18 DIAGNOSIS — O09.299 H/O INCOMPETENT CERVIX, CURRENTLY PREGNANT: Primary | ICD-10-CM

## 2019-11-18 PROCEDURE — 76801 PR US, OB <14WKS, TRANSABD, SINGLE GESTATION: ICD-10-PCS | Mod: 26,S$PBB,, | Performed by: OBSTETRICS & GYNECOLOGY

## 2019-11-18 PROCEDURE — 99212 OFFICE O/P EST SF 10 MIN: CPT | Mod: PBBFAC,TH,25 | Performed by: OBSTETRICS & GYNECOLOGY

## 2019-11-18 PROCEDURE — 76801 OB US < 14 WKS SINGLE FETUS: CPT | Mod: 26,S$PBB,, | Performed by: OBSTETRICS & GYNECOLOGY

## 2019-11-18 PROCEDURE — 99202 PR OFFICE/OUTPT VISIT, NEW, LEVL II, 15-29 MIN: ICD-10-PCS | Mod: S$PBB,TH,25, | Performed by: OBSTETRICS & GYNECOLOGY

## 2019-11-18 PROCEDURE — 76801 OB US < 14 WKS SINGLE FETUS: CPT | Mod: PBBFAC | Performed by: OBSTETRICS & GYNECOLOGY

## 2019-11-18 PROCEDURE — 99999 PR PBB SHADOW E&M-EST. PATIENT-LVL II: CPT | Mod: PBBFAC,,, | Performed by: OBSTETRICS & GYNECOLOGY

## 2019-11-18 PROCEDURE — 99202 OFFICE O/P NEW SF 15 MIN: CPT | Mod: S$PBB,TH,25, | Performed by: OBSTETRICS & GYNECOLOGY

## 2019-11-18 PROCEDURE — 99999 PR PBB SHADOW E&M-EST. PATIENT-LVL II: ICD-10-PCS | Mod: PBBFAC,,, | Performed by: OBSTETRICS & GYNECOLOGY

## 2019-11-18 NOTE — PROGRESS NOTES
Chief complaint: Incompetent cervix desires cerclage.     Provider requesting consultation: Dr. Davenport    24 y.o. [id number] 12w6d EGA.    PMH:  Past Medical History:   Diagnosis Date    Incompetent cervix     Rh incompatibility     Sickle cell trait     Thyroid disease     Hypothyroidism       PObHx:  OB History    Para Term  AB Living   4 3 3 0 0 3   SAB TAB Ectopic Multiple Live Births   0 0 0 0 3      # Outcome Date GA Lbr Tom/2nd Weight Sex Delivery Anes PTL Lv   4 Current            3 Term 17 38w1d / 00:13 2.835 kg (6 lb 4 oz) M Vag-Spont EPI N BURT   2 Term 14 38w0d  3.402 kg (7 lb 8 oz) M Vag-Spont EPI N BURT   1 Term 11 37w0d  3.147 kg (6 lb 15 oz) M Vag-Spont EPI N BURT       PSH:  Past Surgical History:   Procedure Laterality Date    CERVICAL CERCLAGE         Family history:family history includes Hypertension in her mother.    Social history: reports that she has never smoked. She has never used smokeless tobacco. She reports that she does not drink alcohol or use drugs.    A detailed fetal anatomical ultrasound was completed today.  See details in imaging section of [facility].    The patient is referred for cerclage placement.  She is well known to [facility] as we have placed her prior cerclages.  As she is familiar with the procedure, we briefly reviewed the risk, benefits alternatives.  She has not had any complications tthis pregnancy and desires to proceed with the surgery.  The patient was given an opportunity to ask questions about management and the diease process.  She expressed an understanding of and agreement to the above impression and plan. All questions were answered to her satisfaction.  She was given contact information to the [facility] clinic to address further concerns.      The approximate physician face-to-face time was 15 minutes. The majority of the time (>50%) was spent on counseling of the patient or coordination of care.

## 2019-11-18 NOTE — LETTER
November 18, 2019      Vanessa Davenport MD  [address]  [address]  [facility] [address]           [facility]  [address]  [facility] [address]  Phone: [phone]          Patient: Juanita Lipscomb   MR Number: [id number]   YOB: 1995   Date of Visit: 11/18/2019       Dear Dr. Vanessa Davenport:    Thank you for referring Juanita Lipscomb to me for evaluation. Attached you will find relevant portions of my assessment and plan of care.    If you have questions, please do not hesitate to call me. I look forward to following Juanita Lipscomb along with you.    Sincerely,    Atul Mast MD    Enclosure  CC:  No Recipients    If you would like to receive this communication electronically, please contact [email or web address] or [phone] to request more information on [employer] access.    For providers and/or their staff who would like to refer a patient to [facility], please contact us through our one-stop-shop provider referral line, [facility], at [phone].    If you feel you have received this communication in error or would no longer like to receive these types of communications, please e-mail [email or web address]

## 2019-11-19 ENCOUNTER — TELEPHONE (OUTPATIENT)
Dept: MATERNAL FETAL MEDICINE | Facility: CLINIC | Age: 24
End: 2019-11-19

## 2019-11-19 NOTE — TELEPHONE ENCOUNTER
Patient notified of cerclage on 12/04/2019 at 1 PM.  Patient instructed to be on 6 th floor labor and delivery at 12 pm.  Patient NPO after 3 am.  Patient verbalized her understanding.

## 2019-12-04 ENCOUNTER — ANESTHESIA EVENT (OUTPATIENT)
Dept: OBSTETRICS AND GYNECOLOGY | Facility: OTHER | Age: 24
End: 2019-12-04
Payer: MEDICAID

## 2019-12-04 ENCOUNTER — ANESTHESIA (OUTPATIENT)
Dept: OBSTETRICS AND GYNECOLOGY | Facility: OTHER | Age: 24
End: 2019-12-04
Payer: MEDICAID

## 2019-12-04 ENCOUNTER — HOSPITAL ENCOUNTER (OUTPATIENT)
Facility: OTHER | Age: 24
LOS: 1 days | Discharge: HOME | End: 2019-12-04
Attending: OBSTETRICS & GYNECOLOGY | Admitting: OBSTETRICS & GYNECOLOGY
Payer: MEDICAID

## 2019-12-04 VITALS
DIASTOLIC BLOOD PRESSURE: 66 MMHG | BODY MASS INDEX: 27.48 KG/M2 | HEIGHT: 64 IN | TEMPERATURE: 98 F | RESPIRATION RATE: 16 BRPM | WEIGHT: 160.94 LBS | SYSTOLIC BLOOD PRESSURE: 104 MMHG | HEART RATE: 78 BPM

## 2019-12-04 DIAGNOSIS — O34.32 CERVICAL CERCLAGE SUTURE PRESENT IN SECOND TRIMESTER: ICD-10-CM

## 2019-12-04 DIAGNOSIS — Z98.890 H/O CERVICAL CERCLAGE, CURRENTLY PREGNANT: ICD-10-CM

## 2019-12-04 DIAGNOSIS — O09.299 H/O CERVICAL CERCLAGE, CURRENTLY PREGNANT: ICD-10-CM

## 2019-12-04 DIAGNOSIS — O09.299 H/O INCOMPETENT CERVIX, CURRENTLY PREGNANT: Primary | ICD-10-CM

## 2019-12-04 PROCEDURE — 59320 PR REVISION CERVIX W PREG,VAG APPRCH: ICD-10-PCS | Mod: ,,, | Performed by: OBSTETRICS & GYNECOLOGY

## 2019-12-04 PROCEDURE — 00948 ANES VAG PX CRV CERCLAGE: CPT | Performed by: OBSTETRICS & GYNECOLOGY

## 2019-12-04 PROCEDURE — D9220A PRA ANESTHESIA: ICD-10-PCS | Mod: ,,, | Performed by: ANESTHESIOLOGY

## 2019-12-04 PROCEDURE — G0378 HOSPITAL OBSERVATION PER HR: HCPCS

## 2019-12-04 PROCEDURE — 36004722: Performed by: OBSTETRICS & GYNECOLOGY

## 2019-12-04 PROCEDURE — 36004723: Performed by: OBSTETRICS & GYNECOLOGY

## 2019-12-04 PROCEDURE — 37000008 HC ANESTHESIA 1ST 15 MINUTES: Performed by: OBSTETRICS & GYNECOLOGY

## 2019-12-04 PROCEDURE — 59320 REVISION OF CERVIX: CPT | Mod: ,,, | Performed by: OBSTETRICS & GYNECOLOGY

## 2019-12-04 PROCEDURE — 63600175 PHARM REV CODE 636 W HCPCS: Performed by: ANESTHESIOLOGY

## 2019-12-04 PROCEDURE — 99499 UNLISTED E&M SERVICE: CPT | Mod: ,,, | Performed by: OBSTETRICS & GYNECOLOGY

## 2019-12-04 PROCEDURE — 71000039 HC RECOVERY, EACH ADD'L HOUR: Performed by: OBSTETRICS & GYNECOLOGY

## 2019-12-04 PROCEDURE — 27200688 HC TRAY, SPINAL-HYPER/ ISOBARIC: Performed by: ANESTHESIOLOGY

## 2019-12-04 PROCEDURE — D9220A PRA ANESTHESIA: Mod: ,,, | Performed by: ANESTHESIOLOGY

## 2019-12-04 PROCEDURE — 37000009 HC ANESTHESIA EA ADD 15 MINS: Performed by: OBSTETRICS & GYNECOLOGY

## 2019-12-04 PROCEDURE — 99499 NO LOS: ICD-10-PCS | Mod: ,,, | Performed by: OBSTETRICS & GYNECOLOGY

## 2019-12-04 PROCEDURE — 71000033 HC RECOVERY, INTIAL HOUR: Performed by: OBSTETRICS & GYNECOLOGY

## 2019-12-04 RX ORDER — SODIUM CHLORIDE, SODIUM LACTATE, POTASSIUM CHLORIDE, CALCIUM CHLORIDE 600; 310; 30; 20 MG/100ML; MG/100ML; MG/100ML; MG/100ML
INJECTION, SOLUTION INTRAVENOUS CONTINUOUS PRN
Status: DISCONTINUED | OUTPATIENT
Start: 2019-12-04 | End: 2019-12-04

## 2019-12-04 RX ORDER — MIDAZOLAM HYDROCHLORIDE 1 MG/ML
INJECTION INTRAMUSCULAR; INTRAVENOUS
Status: DISCONTINUED | OUTPATIENT
Start: 2019-12-04 | End: 2019-12-04

## 2019-12-04 RX ORDER — FENTANYL CITRATE 50 UG/ML
INJECTION, SOLUTION INTRAMUSCULAR; INTRAVENOUS
Status: DISCONTINUED | OUTPATIENT
Start: 2019-12-04 | End: 2019-12-04

## 2019-12-04 RX ORDER — ONDANSETRON 2 MG/ML
INJECTION INTRAMUSCULAR; INTRAVENOUS
Status: DISCONTINUED | OUTPATIENT
Start: 2019-12-04 | End: 2019-12-04

## 2019-12-04 RX ORDER — MUPIROCIN 20 MG/G
OINTMENT TOPICAL
Status: CANCELLED | OUTPATIENT
Start: 2019-12-04

## 2019-12-04 RX ORDER — SODIUM CHLORIDE 9 MG/ML
INJECTION, SOLUTION INTRAVENOUS CONTINUOUS
Status: CANCELLED | OUTPATIENT
Start: 2019-12-04

## 2019-12-04 RX ADMIN — FENTANYL CITRATE 25 MCG: 50 INJECTION, SOLUTION INTRAMUSCULAR; INTRAVENOUS at 02:12

## 2019-12-04 RX ADMIN — FENTANYL CITRATE 10 MCG: 50 INJECTION, SOLUTION INTRAMUSCULAR; INTRAVENOUS at 01:12

## 2019-12-04 RX ADMIN — MEPIVACAINE HYDROCHLORIDE 3 ML: 15 INJECTION, SOLUTION EPIDURAL; INFILTRATION at 01:12

## 2019-12-04 RX ADMIN — MIDAZOLAM HYDROCHLORIDE 1 MG: 1 INJECTION, SOLUTION INTRAMUSCULAR; INTRAVENOUS at 01:12

## 2019-12-04 RX ADMIN — SODIUM CHLORIDE, SODIUM LACTATE, POTASSIUM CHLORIDE, AND CALCIUM CHLORIDE: 600; 310; 30; 20 INJECTION, SOLUTION INTRAVENOUS at 01:12

## 2019-12-04 RX ADMIN — ONDANSETRON 4 MG: 2 INJECTION INTRAMUSCULAR; INTRAVENOUS at 01:12

## 2019-12-04 NOTE — ANESTHESIA PROCEDURE NOTES
Spinal    Diagnosis: cervical incompetance   Patient location during procedure: OR  Start time: 12/4/2019 1:35 PM  Timeout: 12/4/2019 1:35 PM  End time: 12/4/2019 1:42 PM    Staffing  Authorizing Provider: Sara Elias MD  Performing Provider: Sara Elias MD    Preanesthetic Checklist  Completed: patient identified, site marked, surgical consent, pre-op evaluation, timeout performed, IV checked, risks and benefits discussed and monitors and equipment checked  Spinal Block  Patient position: sitting  Prep: ChloraPrep  Patient monitoring: heart rate, continuous pulse ox and frequent blood pressure checks  Approach: midline  Location: L3-4  Injection technique: single shot  CSF Fluid: clear free-flowing CSF  Needle  Needle type: pencil-tip   Needle gauge: 25 G  Needle length: 3.5 in  Additional Documentation: incremental injection, negative aspiration for heme and no paresthesia on injection  Needle localization: anatomical landmarks  Assessment  Ease of block: easy  Patient's tolerance of the procedure: comfortable throughout block and no complaints

## 2019-12-04 NOTE — TRANSFER OF CARE
"Anesthesia Transfer of Care Note    Patient: Juanita Lipscomb    Procedure(s) Performed: Procedure(s) (LRB):  CERCLAGE, CERVIX (N/A)    Patient location: PACU    Anesthesia Type: spinal    Transport from OR: Transported from OR on room air with adequate spontaneous ventilation    Post pain: adequate analgesia    Post assessment: no apparent anesthetic complications    Post vital signs: stable    Level of consciousness: awake, alert and oriented    Nausea/Vomiting: no nausea/vomiting    Complications: none    Transfer of care protocol was followed      Last vitals:   Visit Vitals  Ht 5' 4" (1.626 m)   Wt 73 kg (160 lb 15 oz)   BMI 27.62 kg/m²     "

## 2019-12-04 NOTE — DISCHARGE SUMMARY
Delivery Discharge Summary  Obstetrics      Primary OB Clinician: Atul Mast MD      Admission date: 2019  Discharge date: 2019    Disposition: To home, self care    Discharge Diagnosis List:      Patient Active Problem List   Diagnosis    Sickle cell trait    Pregnancy, supervision, high-risk/no flu    H/O incompetent cervix, currently pregnant    H/O cervical cerclage, currently pregnant    Cervical cerclage suture present in second trimester       Procedure: cerclage     Hospital Course:  Juanita Lipscomb is a 24 y.o. now , POD #0 who was admitted on 2019 at 15w1d for cerclage placement. Please see op note for procedure details.     Pt in stable condition and ready for discharge. She has been instructed to start and/or continue medications and follow up with her obstetrics provider as listed below.    Pertinent studies:  CBC  No results for input(s): WBC, HGB, HCT, MCV, PLT in the last 168 hours.       Immunization History   Administered Date(s) Administered    Influenza - Quadrivalent - PF (6 months and older) 2016    Tdap 2017        This patient has no babies on file.    Patient Instructions:   Current Discharge Medication List      CONTINUE these medications which have NOT CHANGED    Details   ondansetron (ZOFRAN-ODT) 8 MG TbDL Take 1 tablet (8 mg total) by mouth every 6 (six) hours as needed.  Qty: 12 tablet, Refills: 3    Associated Diagnoses: Nausea/vomiting in pregnancy      PRENATAL VIT #76/IRON,CARB/FA (PNV 29-1 ORAL) Take 1 tablet by mouth once daily.      !! prenatal92-iron-folate8-ps-dha (ENBRACE HR) 1.5 mg iron- 8.73 mg-6.4 mg CpID Take 1 tablet by mouth once daily.  Qty: 30 each, Refills: 11    Associated Diagnoses: Amenorrhea      !! prenatal92-iron-folate8-ps-dha (ENBRACE HR) 1.5 mg iron- 8.73 mg-6.4 mg CpID Take 1 tablet by mouth once daily.  Qty: 90 each, Refills: 2    Associated Diagnoses: Supervision of high risk pregnancy in first trimester       prochlorperazine (COMPAZINE) 10 MG tablet Take 1 tablet (10 mg total) by mouth every 6 (six) hours as needed.  Qty: 15 tablet, Refills: 2    Associated Diagnoses: Supervision of high risk pregnancy in first trimester      terconazole (TERAZOL 7) 0.4 % Crea Place 1 applicator vaginally once daily.  Qty: 45 g, Refills: 0    Associated Diagnoses: Candida albicans infection       !! - Potential duplicate medications found. Please discuss with provider.          Discharge Procedure Orders   Notify your health care provider if you experience any of the following:   Order Comments: Heavy vaginal bleeding, regular contractions, leakage of fluid     Pelvic Rest   Order Comments: Pelvic rest for at least 6 weeks. Nothing in vagina - no sex, tampons, or douching for 6 weeks     Notify your health care provider if you experience any of the following:   Order Comments: Heavy vaginal bleeding saturating more than 1 pad per hour for at least 2 hours.     Notify your health care provider if you experience any of the following:  temperature >100.4     Activity as tolerated            Jennifer Alanis MD  OBGYN, PGY-2

## 2019-12-04 NOTE — OP NOTE
Operative Note       Procedure date: 12/04/2019      Surgeon(s) and Role:    * Atul Tariq MD - Primary    * Susie Hoyos MD - Co- surgeon     * Jennifer Alanis MD - assistant     Pre-op Diagnosis:   1. Intrauterine pregnancy at 15w1  2. H/o incompetent cervix     Post-op Diagnosis:  Same    Procedure(s) (LRB):  Braden cerclage    Complications: none    UOP: 150 cc via in and out catheterization at procedure start     EBL: minimal < 20 cc    Anesthesia: Spinal    Findings/Key Components:   Braden cerclage placed without difficulty.     Procedure in Detail:    The patient was taken to the OR where spinal anesthesia was found to be adequate.  She was placed in the dorsal lithotomy position, then prepped and draped in the normal sterile fashion.  A weighted speculum was inserted into the posterior vagina and a right angle was used to visualize the cervix.  The anterior lip of the cervix was grasped with the ring forceps.  A #1 Ethibond suture was placed at the 12 o'clock position on the cervix with careful attention to avoid the bladder.  The purse string stitch was continued around the cervix at the 9 o'clock, 6 o'clock, and 3 o'clock positions.  The suture was tied at 12 o'clock with a long tail remaining.  The cervix was noted to be hemostatic, and the os was noted to be closed at the end of the procedure. The vagina was irrigated.  The weighted speculum and ring forceps were removed.  The patient was taken out of the dorsal lithotomy position.  She tolerated the procedure well.  Instrument and lap counts were correct times two.  She was transferred to recovery in stable condition.      Disposition: PACU - hemodynamically stable.           Condition: Stable    Jennifer Alanis MD  OBGYN, PGY-2  .

## 2019-12-04 NOTE — ANESTHESIA PREPROCEDURE EVALUATION
2019  [facility]  Anesthesia Pre-Operative Evaluation         Patient Name: Juanita Lipscomb  YOB: 1995  MRN: [id number]    SUBJECTIVE:     Pre-operative evaluation for Procedure(s) (LRB):  CERCLAGE, CERVIX (N/A)     2019    Juanita Lipscomb is a 24 y.o. female  at 15w1d here for cerclage in the setting of cervical incompetence.    LDA: None documented.    Prev airway: None documented.    Drips: None documented.      Patient Active Problem List   Diagnosis    Sickle cell trait    Pregnancy, supervision, high-risk/no flu    H/O incompetent cervix, currently pregnant    H/O cervical cerclage, currently pregnant       Review of patient's allergies indicates:   Allergen Reactions    Watermelon Shortness Of Breath, Itching, Swelling and Edema     Chest pain, angioedema, SOB, after eating watermelon tonight       Current Inpatient Medications:      No current facility-administered medications on file prior to encounter.      Current Outpatient Medications on File Prior to Encounter   Medication Sig Dispense Refill    ondansetron (ZOFRAN-ODT) 8 MG TbDL Take 1 tablet (8 mg total) by mouth every 6 (six) hours as needed. 12 tablet 3    PRENATAL VIT #76/IRON,CARB/FA (PNV 29-1 ORAL) Take 1 tablet by mouth once daily.      prenatal92-iron-folate8-ps-dha (ENBRACE HR) 1.5 mg iron- 8.73 mg-6.4 mg CpID Take 1 tablet by mouth once daily. 30 each 11    prenatal92-iron-folate8-ps-dha (ENBRACE HR) 1.5 mg iron- 8.73 mg-6.4 mg CpID Take 1 tablet by mouth once daily. 90 each 2    prochlorperazine (COMPAZINE) 10 MG tablet Take 1 tablet (10 mg total) by mouth every 6 (six) hours as needed. 15 tablet 2    terconazole (TERAZOL 7) 0.4 % Crea Place 1 applicator vaginally once daily. 45 g 0       Past Surgical History:   Procedure Laterality Date    CERVICAL CERCLAGE         Social  History     Socioeconomic History    Marital status: Single     Spouse name: Not on file    Number of children: Not on file    Years of education: Not on file    Highest education level: Not on file   Occupational History    Not on file   Social Needs    Financial resource strain: Not on file    Food insecurity:     Worry: Not on file     Inability: Not on file    Transportation needs:     Medical: Not on file     Non-medical: Not on file   Tobacco Use    Smoking status: Never Smoker    Smokeless tobacco: Never Used   Substance and Sexual Activity    Alcohol use: No    Drug use: No    Sexual activity: Yes   Lifestyle    Physical activity:     Days per week: Not on file     Minutes per session: Not on file    Stress: Not on file   Relationships    Social connections:     Talks on phone: Not on file     Gets together: Not on file     Attends Buddhism service: Not on file     Active member of club or organization: Not on file     Attends meetings of clubs or organizations: Not on file     Relationship status: Not on file   Other Topics Concern    Not on file   Social History Narrative    Not on file       OBJECTIVE:     Vital Signs Range (Last 24H):         CBC:   No results for input(s): WBC, RBC, HGB, HCT, PLT, MCV, MCH, MCHC in the last 72 hours.    CMP: No results for input(s): NA, K, CL, CO2, BUN, CREATININE, GLU, MG, PHOS, CALCIUM, ALBUMIN, PROT, ALKPHOS, ALT, AST, BILITOT in the last 72 hours.    INR:  No results for input(s): PT, INR, PROTIME, APTT in the last 72 hours.    Diagnostic Studies: No relevant studies.    EKG: No recent studies available.    2D ECHO:  No results found for this or any previous visit.      ASSESSMENT/PLAN:         Anesthesia Evaluation    I have reviewed the Patient Summary Reports.    I have reviewed the Nursing Notes.   I have reviewed the Medications.     Review of Systems  Anesthesia Hx:  Denies Hx of Anesthetic complications  Denies Family Hx of Anesthesia  complications.   Denies Personal Hx of Anesthesia complications.   Social:  Non-Smoker    Hematology/Oncology:     Oncology Normal    -- Anemia:   EENT/Dental:EENT/Dental Normal   Cardiovascular:  Cardiovascular Normal Exercise tolerance: good  Denies Hypertension.     Pulmonary:  Pulmonary Normal  Denies Asthma.    Renal/:  Renal/ Normal     Hepatic/GI:  Hepatic/GI Normal  Denies GERD.    Neurological:  Neurology Normal    Endocrine:   Hypothyroidism        Physical Exam  General:  Well nourished    Airway/Jaw/Neck:  Airway Findings: Mouth Opening: Normal Tongue: Normal  General Airway Assessment: Adult  Mallampati: II       Chest/Lungs:  Chest/Lungs Clear    Heart/Vascular:  Heart Findings: Normal       Mental Status:  Mental Status Findings:  Cooperative         Anesthesia Plan  Type of Anesthesia, risks & benefits discussed:  Anesthesia Type:  spinal, general  Patient's Preference:   Intra-op Monitoring Plan: standard ASA monitors  Intra-op Monitoring Plan Comments:   Post Op Pain Control Plan: multimodal analgesia  Post Op Pain Control Plan Comments:   Induction:   IV  Beta Blocker:  Patient is not currently on a Beta-Blocker (No further documentation required).       Informed Consent: Patient understands risks and agrees with Anesthesia plan.  Questions answered. Anesthesia consent signed with patient.  ASA Score: 2     Day of Surgery Review of History & Physical:    H&P update referred to the surgeon.         Ready For Surgery From Anesthesia Perspective.

## 2019-12-04 NOTE — DISCHARGE INSTRUCTIONS
Call clinic [phone] or L & D after hours at [phone] for vaginal bleeding, leakage of fluids, contractions 4-5 in one hour, decreased fetal movements ( 10 kicks in 2 hours), headache not relieved by Tylenol, blurry vision, or temp of 100.4 or greater.  Keep next clinic appointment

## 2019-12-04 NOTE — H&P
HISTORY AND PHYSICAL                                                OBSTETRICS          Subjective:       Juanita Lipscomb is a 24 y.o.  female with IUP at 15w1d weeks gestation who presents for scheduled cerclage.    Patient denies contractions, denies vaginal bleeding, denies LOF.       This IUP is complicated by h/o incompetent cervix, sickle cell trait and hypothyroidism. .    Review of Systems   Constitutional: Negative for activity change, appetite change, chills and fever.   Eyes: Negative for visual disturbance.   Respiratory: Negative for cough and shortness of breath.    Cardiovascular: Negative for chest pain.   Gastrointestinal: Negative for abdominal pain, constipation, nausea and vomiting.   Genitourinary: Negative for dysuria, frequency, hematuria, pelvic pain, urgency, vaginal bleeding, vaginal discharge and vaginal odor.   Musculoskeletal: Negative for myalgias.   Integumentary:  Negative for rash.   Neurological: Negative for headaches.   Psychiatric/Behavioral: Negative for depression and sleep disturbance. The patient is not nervous/anxious.           PMHx:   Past Medical History:   Diagnosis Date    Incompetent cervix     Rh incompatibility     Sickle cell trait     Thyroid disease     Hypothyroidism       PSHx:   Past Surgical History:   Procedure Laterality Date    CERVICAL CERCLAGE         All:   Review of patient's allergies indicates:   Allergen Reactions    Watermelon Shortness Of Breath, Itching, Swelling and Edema     Chest pain, angioedema, SOB, after eating watermelon tonight       Meds:   Medications Prior to Admission   Medication Sig Dispense Refill Last Dose    ondansetron (ZOFRAN-ODT) 8 MG TbDL Take 1 tablet (8 mg total) by mouth every 6 (six) hours as needed. 12 tablet 3 Taking    PRENATAL VIT #76/IRON,CARB/FA (PNV 29-1 ORAL) Take 1 tablet by mouth once daily.   Taking    prenatal92-iron-folate8-ps-dha (ENBRACE HR) 1.5 mg iron- 8.73 mg-6.4 mg CpID Take 1 tablet  by mouth once daily. 30 each 11 Taking    prenatal92-iron-folate8-ps-dha (ENBRACE HR) 1.5 mg iron- 8.73 mg-6.4 mg CpID Take 1 tablet by mouth once daily. 90 each 2 Taking    prochlorperazine (COMPAZINE) 10 MG tablet Take 1 tablet (10 mg total) by mouth every 6 (six) hours as needed. 15 tablet 2 Taking    terconazole (TERAZOL 7) 0.4 % Crea Place 1 applicator vaginally once daily. 45 g 0 Taking       SH:   Social History     Socioeconomic History    Marital status: Single     Spouse name: Not on file    Number of children: Not on file    Years of education: Not on file    Highest education level: Not on file   Occupational History    Not on file   Social Needs    Financial resource strain: Not on file    Food insecurity:     Worry: Not on file     Inability: Not on file    Transportation needs:     Medical: Not on file     Non-medical: Not on file   Tobacco Use    Smoking status: Never Smoker    Smokeless tobacco: Never Used   Substance and Sexual Activity    Alcohol use: No    Drug use: No    Sexual activity: Yes   Lifestyle    Physical activity:     Days per week: Not on file     Minutes per session: Not on file    Stress: Not on file   Relationships    Social connections:     Talks on phone: Not on file     Gets together: Not on file     Attends Restorationism service: Not on file     Active member of club or organization: Not on file     Attends meetings of clubs or organizations: Not on file     Relationship status: Not on file   Other Topics Concern    Not on file   Social History Narrative    Not on file       FH:   Family History   Problem Relation Age of Onset    Hypertension Mother     Breast cancer Neg Hx     Eclampsia Neg Hx        OBHx:   OB History    Para Term  AB Living   4 3 3 0 0 3   SAB TAB Ectopic Multiple Live Births   0 0 0 0 3      # Outcome Date GA Lbr Tom/2nd Weight Sex Delivery Anes PTL Lv   4 Current            3 Term 17 38w1d / 00:13 2.835 kg (6 lb 4  oz) M Vag-Spont EPI N BURT      Name: JANENE NAVAS      Apgar1: 9  Apgar5: 9   2 Term 07/12/14 38w0d  3.402 kg (7 lb 8 oz) M Vag-Spont EPI N BURT      Name: ashlie Zarate Term 06/23/11 37w0d  3.147 kg (6 lb 15 oz) M Vag-Spont EPI N BURT      Name: davin       Objective:       There were no vitals taken for this visit.    There were no vitals filed for this visit.    General:   alert, appears stated age and cooperative, no apparent distress   HENT:  normocephalic, atraumatic   Eyes:  extraocular movements and conjunctivae normal   Neck:  supple, range of motion normal, no thyromegaly   Lungs:   no respiratory distress   Heart:   regular rate   Abdomen:  soft, non-tender, non-distended but gravid, no rebound or guarding    Extremities negative edema, negative erythema   FHT: Confirmed on bedside ultrasound           Assessment:       15w1d weeks gestation with h/o incompetent cervix     Active Hospital Problems    Diagnosis  POA    H/O cervical cerclage, currently pregnant [O09.299, Z98.890]  Not Applicable      Resolved Hospital Problems   No resolved problems to display.          Plan:   1. H/o incompetent cervix    Risks, benefits, alternatives and possible complications have been discussed in detail with the patient.   - Consents signed and to chart  - Admit to Labor and Delivery unit  - presents for history indicated cerclage   - cervical length normal on last ultrasound   - Epidural per Anesthesia  - Draw CBC, T&S    Jeninfer Alanis MD  OBGYN, PGY-2

## 2019-12-05 ENCOUNTER — ROUTINE PRENATAL (OUTPATIENT)
Dept: OBSTETRICS AND GYNECOLOGY | Facility: CLINIC | Age: 24
End: 2019-12-05
Payer: MEDICAID

## 2019-12-05 ENCOUNTER — PATIENT MESSAGE (OUTPATIENT)
Dept: MATERNAL FETAL MEDICINE | Facility: CLINIC | Age: 24
End: 2019-12-05

## 2019-12-05 VITALS
SYSTOLIC BLOOD PRESSURE: 110 MMHG | BODY MASS INDEX: 27.06 KG/M2 | WEIGHT: 157.63 LBS | DIASTOLIC BLOOD PRESSURE: 70 MMHG

## 2019-12-05 DIAGNOSIS — G44.219 EPISODIC TENSION-TYPE HEADACHE, NOT INTRACTABLE: ICD-10-CM

## 2019-12-05 DIAGNOSIS — O09.299 H/O CERVICAL CERCLAGE, CURRENTLY PREGNANT: ICD-10-CM

## 2019-12-05 DIAGNOSIS — O09.299 H/O INCOMPETENT CERVIX, CURRENTLY PREGNANT: ICD-10-CM

## 2019-12-05 DIAGNOSIS — Z98.890 H/O CERVICAL CERCLAGE, CURRENTLY PREGNANT: ICD-10-CM

## 2019-12-05 DIAGNOSIS — O09.299 H/O INCOMPETENT CERVIX, CURRENTLY PREGNANT: Primary | ICD-10-CM

## 2019-12-05 DIAGNOSIS — O09.92 SUPERVISION OF HIGH RISK PREGNANCY IN SECOND TRIMESTER: Primary | ICD-10-CM

## 2019-12-05 PROCEDURE — 99213 OFFICE O/P EST LOW 20 MIN: CPT | Mod: PBBFAC,TH,PO | Performed by: OBSTETRICS & GYNECOLOGY

## 2019-12-05 PROCEDURE — 99213 PR OFFICE/OUTPT VISIT, EST, LEVL III, 20-29 MIN: ICD-10-PCS | Mod: TH,S$PBB,, | Performed by: OBSTETRICS & GYNECOLOGY

## 2019-12-05 PROCEDURE — 99999 PR PBB SHADOW E&M-EST. PATIENT-LVL III: ICD-10-PCS | Mod: PBBFAC,,, | Performed by: OBSTETRICS & GYNECOLOGY

## 2019-12-05 PROCEDURE — 99213 OFFICE O/P EST LOW 20 MIN: CPT | Mod: TH,S$PBB,, | Performed by: OBSTETRICS & GYNECOLOGY

## 2019-12-05 PROCEDURE — 99999 PR PBB SHADOW E&M-EST. PATIENT-LVL III: CPT | Mod: PBBFAC,,, | Performed by: OBSTETRICS & GYNECOLOGY

## 2019-12-05 RX ORDER — BUTALBITAL, ACETAMINOPHEN AND CAFFEINE 50; 325; 40 MG/1; MG/1; MG/1
1 TABLET ORAL EVERY 6 HOURS PRN
Qty: 30 TABLET | Refills: 1 | Status: SHIPPED | OUTPATIENT
Start: 2019-12-05 | End: 2020-01-04

## 2019-12-05 RX ORDER — PROMETHAZINE HYDROCHLORIDE 25 MG/1
25 TABLET ORAL EVERY 6 HOURS PRN
Qty: 30 TABLET | Refills: 1 | Status: SHIPPED | OUTPATIENT
Start: 2019-12-05 | End: 2020-03-12 | Stop reason: SDUPTHER

## 2019-12-05 RX ORDER — HYDROCODONE BITARTRATE AND ACETAMINOPHEN 5; 325 MG/1; MG/1
1 TABLET ORAL EVERY 6 HOURS PRN
Qty: 12 TABLET | Refills: 0 | Status: SHIPPED | OUTPATIENT
Start: 2019-12-05 | End: 2019-12-09

## 2019-12-05 NOTE — PROGRESS NOTES
Juanita Lipscomb is a 24 y.o. [id number] at 15w2d presents complaining of back pain from cerclage procedure yesterday. She also has nausea and headache and hasnt relief.     This IUP is complicated by cerclage placement (19).      Patient denies contractions, reports slight vaginal bleeding, denies LOF.   Fetal Movement: normal.       /70   Wt 71.5 kg (157 lb 10.1 oz)   BMI 27.06 kg/m²     24 y.o., at 15w2d by Estimated Date of Delivery: 20  Patient Active Problem List   Diagnosis    Sickle cell trait    Pregnancy, supervision, high-risk/no flu    H/O incompetent cervix, currently pregnant    H/O cervical cerclage, currently pregnant    Cervical cerclage suture present in second trimester     OB History    Para Term  AB Living   4 3 3 0 0 3   SAB TAB Ectopic Multiple Live Births   0 0 0 0 3      # Outcome Date GA Lbr Tom/2nd Weight Sex Delivery Anes PTL Lv   4 Current            3 Term 17 38w1d / 00:13 2.835 kg (6 lb 4 oz) M Vag-Spont EPI N BURT   2 Term 14 38w0d  3.402 kg (7 lb 8 oz) M Vag-Spont EPI N BURT   1 Term 11 37w0d  3.147 kg (6 lb 15 oz) M Vag-Spont EPI N BURT       Dating reviewed    Allergies and problem list reviewed and updated    Medical and surgical history reviewed    Prenatal labs reviewed and updated    Physical Exam:  ABD: soft, gravid, nontender    Assessment:  Juanita was seen today for routine prenatal visit, morning sickness and emesis during pregnancy.    Diagnoses and all orders for this visit:    Supervision of high risk pregnancy in second trimester  -     promethazine (PHENERGAN) 25 MG tablet; Take 1 tablet (25 mg total) by mouth every 6 (six) hours as needed for Nausea.    H/O incompetent cervix, currently pregnant  -     US MFM Procedure ([facility]); Future    H/O cervical cerclage, currently pregnant  -     HYDROcodone-acetaminophen (NORCO) 5-325 mg per tablet; Take 1 tablet by mouth every 6 (six) hours as needed for Pain.  -     US MFM  Procedure ([facility]); Future    Episodic tension-type headache, not intractable  -     butalbital-acetaminophen-caffeine -40 mg (FIORICET, ESGIC) -40 mg per tablet; Take 1 tablet by mouth every 6 (six) hours as needed for Headaches. Do not exceed 6 tablets in 24 hours.          Plan:   - follow up 4 Weeks, kick counts, labor precautions    -q2w ultrasound with MFM for cervical length   -20 week detailed US with MFM    Radha Loera MD  OBGYN PGY-1

## 2019-12-05 NOTE — PLAN OF CARE
Pt able to ambulate, void and tolerate po.  Discharge instructions reviewed, pt verbalized understanding. Will follow up in clinic.

## 2019-12-12 NOTE — ANESTHESIA POSTPROCEDURE EVALUATION
Anesthesia Post Evaluation    Patient: Juanita Lipscomb    Procedure(s) Performed: Procedure(s) (LRB):  CERCLAGE, CERVIX (N/A)    Final Anesthesia Type: spinal    Patient location during evaluation: labor & delivery  Patient participation: Yes- Able to Participate  Level of consciousness: awake and alert  Post-procedure vital signs: reviewed and stable  Pain management: adequate  Airway patency: patent    PONV status at discharge: No PONV  Anesthetic complications: no      Cardiovascular status: stable  Respiratory status: unassisted and spontaneous ventilation  Hydration status: euvolemic  Follow-up not needed.              Event Time     Out of Recovery 16:45:00          Pain/Dewayne Score: No data recorded

## 2019-12-16 ENCOUNTER — PROCEDURE VISIT (OUTPATIENT)
Dept: MATERNAL FETAL MEDICINE | Facility: CLINIC | Age: 24
End: 2019-12-16
Payer: MEDICAID

## 2019-12-16 DIAGNOSIS — O09.299 H/O INCOMPETENT CERVIX, CURRENTLY PREGNANT: ICD-10-CM

## 2019-12-16 PROCEDURE — 76815 OB US LIMITED FETUS(S): CPT | Mod: PBBFAC | Performed by: OBSTETRICS & GYNECOLOGY

## 2019-12-16 PROCEDURE — 76815 PR  US,PREGNANT UTERUS,LIMITED, 1/> FETUSES: ICD-10-PCS | Mod: 26,S$PBB,, | Performed by: OBSTETRICS & GYNECOLOGY

## 2019-12-16 PROCEDURE — 76817 TRANSVAGINAL US OBSTETRIC: CPT | Mod: 26,S$PBB,, | Performed by: OBSTETRICS & GYNECOLOGY

## 2019-12-16 PROCEDURE — 76817 TRANSVAGINAL US OBSTETRIC: CPT | Mod: PBBFAC | Performed by: OBSTETRICS & GYNECOLOGY

## 2019-12-16 PROCEDURE — 76815 OB US LIMITED FETUS(S): CPT | Mod: 26,S$PBB,, | Performed by: OBSTETRICS & GYNECOLOGY

## 2019-12-16 PROCEDURE — 76817 PR US, OB, TRANSVAG APPROACH: ICD-10-PCS | Mod: 26,S$PBB,, | Performed by: OBSTETRICS & GYNECOLOGY

## 2019-12-30 ENCOUNTER — PROCEDURE VISIT (OUTPATIENT)
Dept: MATERNAL FETAL MEDICINE | Facility: CLINIC | Age: 24
End: 2019-12-30
Payer: MEDICAID

## 2019-12-30 DIAGNOSIS — O09.299 H/O CERVICAL CERCLAGE, CURRENTLY PREGNANT: ICD-10-CM

## 2019-12-30 DIAGNOSIS — Z98.890 H/O CERVICAL CERCLAGE, CURRENTLY PREGNANT: ICD-10-CM

## 2019-12-30 DIAGNOSIS — O09.299 H/O INCOMPETENT CERVIX, CURRENTLY PREGNANT: ICD-10-CM

## 2019-12-30 PROCEDURE — 76805 OB US >/= 14 WKS SNGL FETUS: CPT | Mod: PBBFAC | Performed by: OBSTETRICS & GYNECOLOGY

## 2019-12-30 PROCEDURE — 76805 OB US >/= 14 WKS SNGL FETUS: CPT | Mod: 26,S$PBB,, | Performed by: OBSTETRICS & GYNECOLOGY

## 2019-12-30 PROCEDURE — 76805 PR US, OB 14+WKS, TRANSABD, SINGLE GESTATION: ICD-10-PCS | Mod: 26,S$PBB,, | Performed by: OBSTETRICS & GYNECOLOGY

## 2020-01-14 ENCOUNTER — ROUTINE PRENATAL (OUTPATIENT)
Dept: OBSTETRICS AND GYNECOLOGY | Facility: CLINIC | Age: 25
End: 2020-01-14
Payer: MEDICAID

## 2020-01-14 VITALS
SYSTOLIC BLOOD PRESSURE: 122 MMHG | DIASTOLIC BLOOD PRESSURE: 70 MMHG | WEIGHT: 166.44 LBS | BODY MASS INDEX: 28.57 KG/M2

## 2020-01-14 DIAGNOSIS — O09.92 SUPERVISION OF HIGH RISK PREGNANCY IN SECOND TRIMESTER: Primary | ICD-10-CM

## 2020-01-14 PROCEDURE — 99999 PR PBB SHADOW E&M-EST. PATIENT-LVL III: ICD-10-PCS | Mod: PBBFAC,,, | Performed by: OBSTETRICS & GYNECOLOGY

## 2020-01-14 PROCEDURE — 99999 PR PBB SHADOW E&M-EST. PATIENT-LVL III: CPT | Mod: PBBFAC,,, | Performed by: OBSTETRICS & GYNECOLOGY

## 2020-01-14 PROCEDURE — 99213 OFFICE O/P EST LOW 20 MIN: CPT | Mod: TH,S$PBB,, | Performed by: OBSTETRICS & GYNECOLOGY

## 2020-01-14 PROCEDURE — 99213 PR OFFICE/OUTPT VISIT, EST, LEVL III, 20-29 MIN: ICD-10-PCS | Mod: TH,S$PBB,, | Performed by: OBSTETRICS & GYNECOLOGY

## 2020-01-14 PROCEDURE — 99213 OFFICE O/P EST LOW 20 MIN: CPT | Mod: PBBFAC,TH,PO | Performed by: OBSTETRICS & GYNECOLOGY

## 2020-01-14 NOTE — PROGRESS NOTES
Juanita Lipscomb is a 24 y.o. [id number] at 21w0d presents for routine prenatal visit.  This IUP is complicated by hx of incompetent cervix.  Patient denies contractions, denies vaginal bleeding, denies LOF.   Fetal Movement: occasional flutters.   She has no complaints today.      /70   Wt 75.5 kg (166 lb 7.2 oz)   BMI 28.57 kg/m²     24 y.o., at 21w0d by Estimated Date of Delivery: 20  Patient Active Problem List   Diagnosis    Sickle cell trait    Pregnancy, supervision, high-risk/no flu    H/O incompetent cervix, currently pregnant    H/O cervical cerclage, currently pregnant    Cervical cerclage suture present in second trimester     OB History    Para Term  AB Living   4 3 3 0 0 3   SAB TAB Ectopic Multiple Live Births   0 0 0 0 3      # Outcome Date GA Lbr Tom/2nd Weight Sex Delivery Anes PTL Lv   4 Current            3 Term 17 38w1d / 00:13 2.835 kg (6 lb 4 oz) M Vag-Spont EPI N BURT   2 Term 14 38w0d  3.402 kg (7 lb 8 oz) M Vag-Spont EPI N BURT   1 Term / 37w0d  3.147 kg (6 lb 15 oz) M Vag-Spont EPI N BURT       Dating reviewed    Allergies and problem list reviewed and updated    Medical and surgical history reviewed    Prenatal labs reviewed and updated    Physical Exam:  ABD: soft, gravid, nontender  FH: 22 cm  FHT: 135 bpm    Juanita was seen today for routine prenatal visit.    Diagnoses and all orders for this visit:    Supervision of high risk pregnancy in second trimester          Assessment/Plan:  1. IUP @ 21.0 weeks  - No complaints  - Glucola @ next visit     2. Hx of incompetent cervix  - Cerclage in place       Follow up 4 Weeks, labor precautions      Alicia Matos M.D.  OBGYN PGY1

## 2020-02-07 ENCOUNTER — HOSPITAL ENCOUNTER (EMERGENCY)
Facility: OTHER | Age: 25
Discharge: HOME | End: 2020-02-07
Attending: OBSTETRICS & GYNECOLOGY
Payer: MEDICAID

## 2020-02-07 VITALS
SYSTOLIC BLOOD PRESSURE: 126 MMHG | TEMPERATURE: 98 F | DIASTOLIC BLOOD PRESSURE: 58 MMHG | HEART RATE: 89 BPM | OXYGEN SATURATION: 100 % | RESPIRATION RATE: 18 BRPM

## 2020-02-07 DIAGNOSIS — Z3A.24 24 WEEKS GESTATION OF PREGNANCY: ICD-10-CM

## 2020-02-07 DIAGNOSIS — O34.32 CERVICAL CERCLAGE SUTURE PRESENT IN SECOND TRIMESTER: ICD-10-CM

## 2020-02-07 DIAGNOSIS — O46.90 VAGINAL BLEEDING IN PREGNANCY: Primary | ICD-10-CM

## 2020-02-07 DIAGNOSIS — R11.2 NAUSEA AND VOMITING, INTRACTABILITY OF VOMITING NOT SPECIFIED, UNSPECIFIED VOMITING TYPE: ICD-10-CM

## 2020-02-07 LAB
BACTERIA #/AREA URNS HPF: NORMAL /HPF
BILIRUB UR QL STRIP: NEGATIVE
CLARITY UR: ABNORMAL
COLOR UR: YELLOW
GLUCOSE UR QL STRIP: NEGATIVE
HGB UR QL STRIP: ABNORMAL
KETONES UR QL STRIP: NEGATIVE
LEUKOCYTE ESTERASE UR QL STRIP: ABNORMAL
MICROSCOPIC COMMENT: NORMAL
NITRITE UR QL STRIP: NEGATIVE
PH UR STRIP: 7 [PH] (ref 5–8)
PROT UR QL STRIP: NEGATIVE
RBC #/AREA URNS HPF: 3 /HPF (ref 0–4)
SP GR UR STRIP: <=1.005 (ref 1–1.03)
URN SPEC COLLECT METH UR: ABNORMAL
UROBILINOGEN UR STRIP-ACNC: 1 EU/DL
WBC #/AREA URNS HPF: 3 /HPF (ref 0–5)

## 2020-02-07 PROCEDURE — 59025 PR FETAL 2N-STRESS TEST: ICD-10-PCS | Mod: 26,,, | Performed by: OBSTETRICS & GYNECOLOGY

## 2020-02-07 PROCEDURE — 59025 FETAL NON-STRESS TEST: CPT | Mod: 26,,, | Performed by: OBSTETRICS & GYNECOLOGY

## 2020-02-07 PROCEDURE — 25000003 PHARM REV CODE 250: Performed by: STUDENT IN AN ORGANIZED HEALTH CARE EDUCATION/TRAINING PROGRAM

## 2020-02-07 PROCEDURE — 59025 FETAL NON-STRESS TEST: CPT

## 2020-02-07 PROCEDURE — 99284 PR EMERGENCY DEPT VISIT,LEVEL IV: ICD-10-PCS | Mod: 25,,, | Performed by: OBSTETRICS & GYNECOLOGY

## 2020-02-07 PROCEDURE — 25000003 PHARM REV CODE 250: Performed by: OBSTETRICS & GYNECOLOGY

## 2020-02-07 PROCEDURE — 99284 EMERGENCY DEPT VISIT MOD MDM: CPT | Mod: 25

## 2020-02-07 PROCEDURE — 81000 URINALYSIS NONAUTO W/SCOPE: CPT

## 2020-02-07 PROCEDURE — 99284 EMERGENCY DEPT VISIT MOD MDM: CPT | Mod: 25,,, | Performed by: OBSTETRICS & GYNECOLOGY

## 2020-02-07 RX ORDER — ONDANSETRON 4 MG/1
4 TABLET, ORALLY DISINTEGRATING ORAL ONCE
Status: COMPLETED | OUTPATIENT
Start: 2020-02-07 | End: 2020-02-07

## 2020-02-07 RX ORDER — NIFEDIPINE 10 MG/1
10 CAPSULE ORAL ONCE
Status: COMPLETED | OUTPATIENT
Start: 2020-02-07 | End: 2020-02-07

## 2020-02-07 RX ORDER — ACETAMINOPHEN 500 MG
1000 TABLET ORAL ONCE
Status: COMPLETED | OUTPATIENT
Start: 2020-02-07 | End: 2020-02-07

## 2020-02-07 RX ADMIN — ACETAMINOPHEN 1000 MG: 500 TABLET ORAL at 08:02

## 2020-02-07 RX ADMIN — ONDANSETRON 4 MG: 4 TABLET, ORALLY DISINTEGRATING ORAL at 08:02

## 2020-02-07 RX ADMIN — NIFEDIPINE 10 MG: 10 CAPSULE ORAL at 08:02

## 2020-02-08 NOTE — DISCHARGE INSTRUCTIONS
The following signs may be a warning that you may need medical care.  · Severe headache not relieved by tylenol.  · Blurry vision or seeing spots.  · Sudden swelling in your face or hands.  · Sudden weight gain in only a few days.  · Severe stomach pains or cramps.  · Vomiting lasting more than 24 hours.  · Fever greater than 100.4 degrees.  · Vaginal bleeding that is more than just spotting.  · Excessive and unusual vaginal discharge.  · A gush or flow of watery fluid from your vagina.  · Decrease or absence of baby's movement (starting at 28 weeks).  ·  (less than 37 weeks) labor: more than 4 contractions in an hour for 2 hours.  · Term (greater than 37 weeks) labor: contractions every 5 minutes for 2 hours.    Characteristics of true labor:  1. Regular contractions every 5 minutes for 2 hours  2. Contractions get stronger in intensity and last longer (average contraction is about 1 minute)  3. Walking or resting does not make the contractions go away  4. Bloody show or a pink discharge may occur  5. A sudden gush or leaking of watery fluid from the vagina may be because your water bag has broken  6. The cervix begins to dilate or open    Characteristics of false labor:  1. Contractions are not in a regular pattern  2. Contractions do not get stronger in intensity  3. Changing your activity, such as walking or lying down, may make the pain of contractions lessen  4. Staying hydrated with plenty of water and/or taking a dose of Tylenol makes the contractions decrease  5. The cervix does not dilate    How to time contractions:  Time contraction from the start of a pain to the start of another pain. Contraction last about 1 minute. When you hurt, write down the time the pain starts each time. You will begin to see if a regular pattern develops.

## 2020-02-08 NOTE — ED PROVIDER NOTES
Encounter Date: 2020       History     Chief Complaint   Patient presents with    Vaginal Bleeding     Juanita Lipscomb is a 24 y.o. [id number] at 24w3d presents complaining of vaginal bleeding. She had bled through her underwear and soaked her jeans on her way home from work. She felt as if she needed to have a bowel movement so went to the bathroom and strained - was unable to have one but noted that there was maninder blood present in the toilet.    This IUP is complicated by incompetent cervix - patient has cerclage in place.  Patient denies contractions, reports vaginal bleeding, denies LOF.   Fetal Movement: normal. She does acknowledge cramping in her lower pelvic region - rates it as a 8/10.     Patient also admits to having sex last night.         Review of patient's allergies indicates:   Allergen Reactions    Watermelon Shortness Of Breath, Itching, Swelling and Edema     Chest pain, angioedema, SOB, after eating watermelon tonight     Past Medical History:   Diagnosis Date    Incompetent cervix     Rh incompatibility     Sickle cell trait     Thyroid disease     Hypothyroidism     Past Surgical History:   Procedure Laterality Date    CERVICAL CERCLAGE      CERVICAL CERCLAGE N/A 2019    Procedure: CERCLAGE, CERVIX;  Surgeon: Atul Mast MD;  Location: [facility] L&D;  Service: OB/GYN;  Laterality: N/A;  alejandro would like 10am     Family History   Problem Relation Age of Onset    Hypertension Mother     Breast cancer Neg Hx     Eclampsia Neg Hx      Social History     Tobacco Use    Smoking status: Never Smoker    Smokeless tobacco: Never Used   Substance Use Topics    Alcohol use: No    Drug use: No     Review of Systems   Constitutional: Negative for chills and fever.   HENT: Negative for facial swelling.    Eyes: Negative for visual disturbance.   Respiratory: Negative for chest tightness and shortness of breath.    Cardiovascular: Negative for chest pain.   Gastrointestinal:  Patient's mom called to report patient's appetite has been decreased for the past week. Has always been a picky eater, but it seems different now. Is not eating as much with each meal and does not want to sit down to eat. Did stool clean out last month with Miralax and has been titrating Miralax between 3/4 and 1 1/4 capfuls daily. Mom reports patient did have some harder stools last week, followed by some explosive stools. Some stools have been painful to pass. This week, stools have been generally mushy, 1-2x/day, though has had some days without any stools. Having increased gas pains for which mom tried gas drops, but reports this made symptoms worse. Denies vomiting or other symptoms. No change to diet or medications. Drinking ok. Mom concerned that patient will start to lose weight and is worried that the food she is eating may be running through her. Has not had decreased appetite previously with her constipation symptoms. Advised mom that Dr. Martin is at another clinic today, but that message would be sent to him. Will contact mom with his recommendations.    "Positive for nausea and vomiting ("normal for her pregnancy"). Negative for abdominal pain.   Genitourinary: Positive for dysuria and vaginal bleeding. Negative for vaginal discharge.   Skin: Negative.    Neurological: Negative for headaches.   Psychiatric/Behavioral: Negative.        Physical Exam     Initial Vitals   BP Pulse Resp Temp SpO2   02/07/20 1906 02/07/20 1906 02/07/20 1906 02/07/20 1906 02/07/20 1907   101/73 94 18 98.4 °F (36.9 °C) 100 %      MAP       --                Physical Exam    Vitals reviewed.  Constitutional: She appears well-developed and well-nourished.   HENT:   Head: Normocephalic and atraumatic.   Eyes: EOM are normal.   Neck: Normal range of motion. Neck supple.   Cardiovascular: Normal rate, regular rhythm and normal heart sounds.   Pulmonary/Chest: Breath sounds normal.   Abdominal: Soft. She exhibits no distension. There is tenderness (diffuse). There is no rebound and no guarding.   Genitourinary: Vagina normal and uterus normal.   Musculoskeletal: Normal range of motion.   Neurological: She is alert and oriented to person, place, and time.   Skin: Skin is warm and dry.   Psychiatric: She has a normal mood and affect.     OB LABOR EXAM:       Method: Sterile speculum exam per MD and Sterile vaginal exam per MD.   Vaginal Bleeding: brown.     Dilatation: 0.           Comments: Presence of vaginal bleeding on exam. Brown in color, but actively coming from cervical os.   Cerclage visualized in place.        ED Course   Obtain Fetal nonstress test (NST)  Date/Time: 2/7/2020 11:21 PM  Performed by: Alicia Matos MD  Authorized by: Britt Bunn DO     Nonstress Test:     Variability:  6-25 BPM    Accelerations:  10 bpm    Baseline:  125    Contractions:  Not present  Biophysical Profile:     Nonstress Test Interpretation: reactive      Overall Impression:  Reassuring  Post-procedure:     Patient tolerance:  Patient tolerated the procedure well with no immediate " complications      Labs Reviewed   URINALYSIS, REFLEX TO URINE CULTURE - Abnormal; Notable for the following components:       Result Value    Appearance, UA Hazy (*)     Specific Gravity, UA <=1.005 (*)     Occult Blood UA 2+ (*)     Leukocytes, UA Trace (*)     All other components within normal limits    Narrative:     Preferred Collection Type->Urine, Clean Catch   URINALYSIS MICROSCOPIC    Narrative:     Preferred Collection Type->Urine, Clean Catch          Imaging Results    None          Medical Decision Making:   ED Management:  NST x 20 minutes  [place]  Speculum exam shows vaginal bleeding - brown in color, but actively coming from os  SVE: closed on exam  Will give Tylenol for pain, IV fluids + Procardia 10mg for cramping   Zofran for nausea   U/A sent 2/2 dysuria > most likely contaminated 2/2 vaginal bleeding  Patient pain and bleeding improved. No blood on pad after 4 hours  Discharged home with reassurance and precautions  Other:   I have discussed this case with another health care provider.       <> Summary of the Discussion: Dr. Bunn               Attending Attestation:   Physician Attestation Statement for Resident:  As the supervising MD   Physician Attestation Statement: I have personally seen and examined this patient.   I agree with the above history. -:   As the supervising MD I agree with the above PE.    As the supervising MD I agree with the above treatment, course, plan, and disposition.  I was personally present during the critical portions of the procedure(s) performed by the resident and was immediately available in the ED to provide services and assistance as needed during the entire procedure.  I have reviewed and agree with the residents interpretation of the following: EKG.  I have reviewed the following: old records at this facility.                    ED Course as of Feb 07 2319 Fri Feb 07, 2020   1950 I evaluated Ms. Lipscomb and discussed plan with Dr. Matos.  Pt presents at  24w3d with history indicated cerclage who presents with bleeding.  She has cramping as well.  Had intercourse this morning.  VSS, fetal status reassuring.  Irritability on toco.  Small blood on spec exam by me, cerclage in place. Cervix closed on digital exam  Imaging reviewed, placenta anterior.  Last cervical length 19 was close to 4cm.   Will po/iv hydrate (pt hard stick) procardia 10mg, tylenol, IV fluids, and place pad.  Watch over the next 4 hours.  Admit if bleeding or cramping continues       [HU]    Pt cramping feeling better.  No further bleeding    [HU]   2300 No further bleeding on exam, no cramping, stable for d/c home   labor precautions discussed    [HU]      ED Course User Index  [HU] Britt Bunn DO                Clinical Impression:       ICD-10-CM ICD-9-CM   1. Vaginal bleeding in pregnancy O46.90 641.93   2. 24 weeks gestation of pregnancy Z3A.24 V22.2   3. Nausea and vomiting, intractability of vomiting not specified, unspecified vomiting type R11.2 787.01   4. Cervical cerclage suture present in second trimester O34.32 654.53         Disposition:   Disposition: Discharged  Condition: Stable          Alicia Matos M.D.  OBGYN PGY1           Alicia Matos MD  Resident  [phone]       Britt Bunn DO  02/10/20 [id number]

## 2020-02-11 ENCOUNTER — ROUTINE PRENATAL (OUTPATIENT)
Dept: OBSTETRICS AND GYNECOLOGY | Facility: CLINIC | Age: 25
End: 2020-02-11
Payer: MEDICAID

## 2020-02-11 VITALS — BODY MASS INDEX: 29.44 KG/M2 | DIASTOLIC BLOOD PRESSURE: 70 MMHG | WEIGHT: 171.5 LBS | SYSTOLIC BLOOD PRESSURE: 100 MMHG

## 2020-02-11 DIAGNOSIS — L30.9 ECZEMA, UNSPECIFIED TYPE: ICD-10-CM

## 2020-02-11 DIAGNOSIS — O09.92 SUPERVISION OF HIGH RISK PREGNANCY IN SECOND TRIMESTER: Primary | ICD-10-CM

## 2020-02-11 DIAGNOSIS — O09.299 H/O INCOMPETENT CERVIX, CURRENTLY PREGNANT: ICD-10-CM

## 2020-02-11 DIAGNOSIS — O34.32 CERVICAL CERCLAGE SUTURE PRESENT IN SECOND TRIMESTER: ICD-10-CM

## 2020-02-11 PROCEDURE — 99213 PR OFFICE/OUTPT VISIT, EST, LEVL III, 20-29 MIN: ICD-10-PCS | Mod: TH,S$PBB,, | Performed by: OBSTETRICS & GYNECOLOGY

## 2020-02-11 PROCEDURE — 99999 PR PBB SHADOW E&M-EST. PATIENT-LVL III: ICD-10-PCS | Mod: PBBFAC,,, | Performed by: OBSTETRICS & GYNECOLOGY

## 2020-02-11 PROCEDURE — 99213 OFFICE O/P EST LOW 20 MIN: CPT | Mod: PBBFAC,TH,PO | Performed by: OBSTETRICS & GYNECOLOGY

## 2020-02-11 PROCEDURE — 99999 PR PBB SHADOW E&M-EST. PATIENT-LVL III: CPT | Mod: PBBFAC,,, | Performed by: OBSTETRICS & GYNECOLOGY

## 2020-02-11 PROCEDURE — 99213 OFFICE O/P EST LOW 20 MIN: CPT | Mod: TH,S$PBB,, | Performed by: OBSTETRICS & GYNECOLOGY

## 2020-02-11 PROCEDURE — 90471 IMMUNIZATION ADMIN: CPT | Mod: PBBFAC,PO

## 2020-02-11 RX ORDER — FLUTICASONE PROPIONATE 50 MCG
SPRAY, SUSPENSION (ML) NASAL
COMMUNITY
Start: 2020-01-23

## 2020-03-12 ENCOUNTER — TELEPHONE (OUTPATIENT)
Dept: OBSTETRICS AND GYNECOLOGY | Facility: CLINIC | Age: 25
End: 2020-03-12

## 2020-03-12 DIAGNOSIS — O09.92 SUPERVISION OF HIGH RISK PREGNANCY IN SECOND TRIMESTER: ICD-10-CM

## 2020-03-12 DIAGNOSIS — O21.9 NAUSEA/VOMITING IN PREGNANCY: ICD-10-CM

## 2020-03-12 RX ORDER — ONDANSETRON 8 MG/1
8 TABLET, ORALLY DISINTEGRATING ORAL EVERY 6 HOURS PRN
Qty: 60 TABLET | Refills: 3 | Status: SHIPPED | OUTPATIENT
Start: 2020-03-12

## 2020-03-12 RX ORDER — PROMETHAZINE HYDROCHLORIDE 25 MG/1
25 TABLET ORAL EVERY 6 HOURS PRN
Qty: 60 TABLET | Refills: 3 | Status: SHIPPED | OUTPATIENT
Start: 2020-03-12

## 2020-03-12 NOTE — TELEPHONE ENCOUNTER
----- Message from Jimena Holliday LPN sent at 3/12/2020 11:22 AM CDT -----      ----- Message -----  From: Abdoulaye Camacho  Sent: 3/12/2020   9:26 AM CDT  To: Issac BRIONES Staff    Ob pt refilled on her nausea medication. Pt can be reached at [phone].

## 2020-03-20 ENCOUNTER — TELEPHONE (OUTPATIENT)
Dept: ADMINISTRATIVE | Facility: CLINIC | Age: 25
End: 2020-03-20

## 2020-03-20 NOTE — TELEPHONE ENCOUNTER
Patient contacted as part of proactive OB COVID-19 hotline outreach. Currently asymptomatic. Informed about COVID-19 hotline availability. Given number, [phone], with instructions to call with any questions or new onset fever, cough or shortness of breath. No issues at present.    Vanessa Green, MS3

## 2020-03-24 PROBLEM — O09.299 PREGNANCY WITH POOR REPRODUCTIVE HISTORY, ANTEPARTUM: Status: ACTIVE | Noted: 2019-11-07

## 2020-03-24 PROBLEM — O34.30 CERVICAL CERCLAGE SUTURE PRESENT, ANTEPARTUM: Status: ACTIVE | Noted: 2019-12-04

## 2020-03-25 ENCOUNTER — TELEPHONE (OUTPATIENT)
Dept: OBSTETRICS AND GYNECOLOGY | Facility: CLINIC | Age: 25
End: 2020-03-25

## 2020-03-25 ENCOUNTER — PATIENT MESSAGE (OUTPATIENT)
Dept: ADMINISTRATIVE | Facility: OTHER | Age: 25
End: 2020-03-25

## 2020-03-25 ENCOUNTER — OFFICE VISIT (OUTPATIENT)
Dept: OBSTETRICS AND GYNECOLOGY | Facility: CLINIC | Age: 25
End: 2020-03-25
Payer: MEDICAID

## 2020-03-25 DIAGNOSIS — O26.893 HEARTBURN DURING PREGNANCY IN THIRD TRIMESTER: ICD-10-CM

## 2020-03-25 DIAGNOSIS — O34.33 CERVICAL CERCLAGE SUTURE PRESENT IN THIRD TRIMESTER: ICD-10-CM

## 2020-03-25 DIAGNOSIS — Z71.89 ADVICE GIVEN ABOUT 2019 NOVEL CORONAVIRUS INFECTION: ICD-10-CM

## 2020-03-25 DIAGNOSIS — O09.293 PREGNANCY WITH POOR REPRODUCTIVE HISTORY IN THIRD TRIMESTER: Primary | ICD-10-CM

## 2020-03-25 DIAGNOSIS — R12 HEARTBURN DURING PREGNANCY IN THIRD TRIMESTER: ICD-10-CM

## 2020-03-25 PROCEDURE — 99213 OFFICE O/P EST LOW 20 MIN: CPT | Mod: TH,95,, | Performed by: OBSTETRICS & GYNECOLOGY

## 2020-03-25 PROCEDURE — 99213 PR OFFICE/OUTPT VISIT, EST, LEVL III, 20-29 MIN: ICD-10-PCS | Mod: TH,95,, | Performed by: OBSTETRICS & GYNECOLOGY

## 2020-03-25 RX ORDER — FAMOTIDINE 40 MG/1
40 TABLET, FILM COATED ORAL NIGHTLY
Qty: 30 TABLET | Refills: 2 | Status: SHIPPED | OUTPATIENT
Start: 2020-03-25 | End: 2020-05-12 | Stop reason: SDUPTHER

## 2020-03-25 NOTE — TELEPHONE ENCOUNTER
----- Message from Caiiln Muñoz MA sent at 3/25/2020  3:12 PM CDT -----      ----- Message -----  From: SILVIA Oakes MD  Sent: 3/25/2020   9:59 AM CDT  To: Champ [facility]k Staff    Please schedule  - Lab appt (CBC and glucose screen)  - OB virtual visit in 1 week  Thanks.

## 2020-03-25 NOTE — PROGRESS NOTES
The patient location is: Home  The chief complaint leading to consultation is: Routine prenatal  Visit type: Virtual visit with synchronous audio and video  Total time spent with patient: 15 minutes  Each patient to whom he or she provides medical services by telemedicine is:  (1) informed of the relationship between the physician and patient and the respective role of any other health care provider with respect to management of the patient; and (2) notified that he or she may decline to receive medical services by telemedicine and may withdraw from such care at any time.    Chief Complaint   Patient presents with    Routine Prenatal Visit       24 y.o., at 31w1d by Estimated Date of Delivery: 20    HPI: Occasional episodes of feeling hot; she does not have a thermometer to confirm fever.  No cough, sore throat, muscle aches, SOB, or diarrhea.  Occasional vomiting, relieved with phenergan.  Heartburn in evening.  Ten episodes of contractions per day lasting about 5 minutes each.  No bleeding or loss of fluid.    ROS  OBSTETRICS:   Contractions As above   Bleeding As above   Loss of fluid As above   Fetal mvmnt Y  GASTRO:   Nausea As above   Vomiting As above      OB History    Para Term  AB Living   4 3 3 0 0 3   SAB TAB Ectopic Multiple Live Births   0 0 0 0 3      # Outcome Date GA Lbr Tom/2nd Weight Sex Delivery Anes PTL Lv   4 Current            3 Term 17 38w1d / 00:13 2.835 kg (6 lb 4 oz) M Vag-Spont EPI N BURT   2 Term 14 38w0d  3.402 kg (7 lb 8 oz) M Vag-Spont EPI N BURT   1 Term 11 37w0d  3.147 kg (6 lb 15 oz) M Vag-Spont EPI N BURT       Dating reviewed  Allergies and problem list reviewed and updated  Medical and surgical history reviewed  Prenatal labs reviewed and updated    PHYSICAL EXAM  No vitals.  Virtual visit.    GENERAL: No acute distress  HEENT: Normocephalic  NEURO: Alert and oriented x3  PSYCH: Normal mood and affect  PULMONARY: Non-labored respiration; no  increased work of breathing      ASSESSMENT AND PLAN    pregnancy Problems (from 17 to present)     Problem Noted Resolved    Pregnancy with poor reproductive history, no flu/TDAP/bot/patch 2019 by Joaquin Andrew MD No    Priority:  1 - High      Overview Signed 3/24/2020  4:27 PM by SILVIA Oakes MD     History of incompetent cervix requiring cerclage in prior pregnancy.         Cervical cerclage suture present, antepartum 2019 by La Galdamez MD No    Priority:  2       Sickle cell trait 10/14/2019 by Vanessa Davenport MD No    Priority:  3             31 weeks / Poor OB history  - Occasional contractions.  No bleeding.   labor precautions given.  - Schedule glucose screen and repeat CBC.  - Connected MOM ordered  Cerclage  - Occasional contractions, not consistent.  No bleeding or loss of fluid.  -  labor precautions given.  Heartburn  - Heartburn at night  - New vomiting as well  - Pepcid  COVID-19 counseling  - Occasional feeling hot.  Occasional episode of vomiting.  - No URI symptoms, myalgias, SOB, chest pressure, or diarrhea  - OB COVID hotline number given  - Recommended getting a thermometer to check temperature; counseled on fever of 100.4  - Call hotline with URI symptoms and fever for screening and to arrange testing if indicated  - If SOB or chest pressure, to ED       labor precautions given  Follow-up: OB virtual visit in 1 week

## 2020-04-01 ENCOUNTER — OFFICE VISIT (OUTPATIENT)
Dept: OBSTETRICS AND GYNECOLOGY | Facility: CLINIC | Age: 25
End: 2020-04-01
Payer: MEDICAID

## 2020-04-01 DIAGNOSIS — O09.293 PREGNANCY WITH POOR REPRODUCTIVE HISTORY IN THIRD TRIMESTER: Primary | ICD-10-CM

## 2020-04-01 DIAGNOSIS — O34.33 CERVICAL CERCLAGE SUTURE PRESENT IN THIRD TRIMESTER: ICD-10-CM

## 2020-04-01 PROCEDURE — 99213 OFFICE O/P EST LOW 20 MIN: CPT | Mod: TH,95,, | Performed by: OBSTETRICS & GYNECOLOGY

## 2020-04-01 PROCEDURE — 99213 PR OFFICE/OUTPT VISIT, EST, LEVL III, 20-29 MIN: ICD-10-PCS | Mod: TH,95,, | Performed by: OBSTETRICS & GYNECOLOGY

## 2020-04-01 NOTE — PROGRESS NOTES
The patient location is: Home  The chief complaint leading to consultation is: Routine prenatal  Visit type: Virtual visit with synchronous audio and video  Total time spent with patient: 10 minutes  Each patient to whom he or she provides medical services by telemedicine is:  (1) informed of the relationship between the physician and patient and the respective role of any other health care provider with respect to management of the patient; and (2) notified that he or she may decline to receive medical services by telemedicine and may withdraw from such care at any time.    Chief Complaint   Patient presents with    Routine Prenatal Visit       24 y.o., at 32w1d by Estimated Date of Delivery: 20    HPI: Still with occasional hot flashes, but no chills or myalgias.  She does not have a thermometer.  No shortness or breath, chest pressure, sore throat, cough, or diarrhea.  She notes worsening pelvic pressure and abdominal pain for the past 5 days.  Some days it is bad enough that she can't walk.  No bleeding.  Increased discharge over this time.    ROS  OBSTETRICS:   Contractions As above   Bleeding As above   Loss of fluid N   Fetal mvmnt Y  GASTRO:   Nausea N   Vomiting N      OB History    Para Term  AB Living   4 3 3 0 0 3   SAB TAB Ectopic Multiple Live Births   0 0 0 0 3      # Outcome Date GA Lbr Tom/2nd Weight Sex Delivery Anes PTL Lv   4 Current            3 Term 17 38w1d / 00:13 2.835 kg (6 lb 4 oz) M Vag-Spont EPI N BURT   2 Term 14 38w0d  3.402 kg (7 lb 8 oz) M Vag-Spont EPI N BURT   1 Term 11 37w0d  3.147 kg (6 lb 15 oz) M Vag-Spont EPI N BURT       Dating reviewed  Allergies and problem list reviewed and updated  Medical and surgical history reviewed  Prenatal labs reviewed and updated    PHYSICAL EXAM  No vitals.  Virtual visit.    GENERAL: No acute distress  HEENT: Normocephalic  NEURO: Alert and oriented x3  PSYCH: Normal mood and affect  PULMONARY: Non-labored  respiration; no increased work of breathing      ASSESSMENT AND PLAN    pregnancy Problems (from 19 to present)     Problem Noted Resolved    Pregnancy with poor reproductive history, no flu/TDAP/bot/patch 2019 by Joaquin Andrew MD No    Priority:  1 - High      Overview Signed 3/24/2020  4:27 PM by SILVIA Oakes MD     History of incompetent cervix requiring cerclage in prior pregnancy.         Cervical cerclage suture present, antepartum 2019 by La Galdamez MD No    Priority:  2       Sickle cell trait 10/14/2019 by Vanessa Davenport MD No    Priority:  3             32 weeks / Poor OB history  - Will re-schedule glucose screen and repeat CBC  Cerclage  - Increasing pressure and pain.  No discrete contractions per se.  - No bleeding  - Will schedule for clinic exam tomorrow or Friday  - Strict  labor precautions given    I think her other symptoms are likely pregnancy related and not COVID-19.  OK for clinic appt.  Will have pt wear a mask at appt.       labor precautions given  Follow-up: 1-2 days

## 2020-04-01 NOTE — Clinical Note
Please schedule patient for clinic appt on Thursday or Friday (4/2 or 4/3).  Make a note that she needs to wear a mask.

## 2020-04-02 ENCOUNTER — ROUTINE PRENATAL (OUTPATIENT)
Dept: OBSTETRICS AND GYNECOLOGY | Facility: CLINIC | Age: 25
End: 2020-04-02
Payer: MEDICAID

## 2020-04-02 VITALS
BODY MASS INDEX: 29.71 KG/M2 | SYSTOLIC BLOOD PRESSURE: 112 MMHG | DIASTOLIC BLOOD PRESSURE: 60 MMHG | WEIGHT: 173.06 LBS

## 2020-04-02 DIAGNOSIS — O34.33 CERVICAL CERCLAGE SUTURE PRESENT IN THIRD TRIMESTER: ICD-10-CM

## 2020-04-02 DIAGNOSIS — N89.8 VAGINAL DISCHARGE: ICD-10-CM

## 2020-04-02 DIAGNOSIS — O09.293 PREGNANCY WITH POOR REPRODUCTIVE HISTORY IN THIRD TRIMESTER: Primary | ICD-10-CM

## 2020-04-02 PROCEDURE — 87491 CHLMYD TRACH DNA AMP PROBE: CPT

## 2020-04-02 PROCEDURE — 99214 PR OFFICE/OUTPT VISIT, EST, LEVL IV, 30-39 MIN: ICD-10-PCS | Mod: TH,S$PBB,, | Performed by: OBSTETRICS & GYNECOLOGY

## 2020-04-02 PROCEDURE — 99212 OFFICE O/P EST SF 10 MIN: CPT | Mod: PBBFAC,TH,PO | Performed by: OBSTETRICS & GYNECOLOGY

## 2020-04-02 PROCEDURE — 99214 OFFICE O/P EST MOD 30 MIN: CPT | Mod: TH,S$PBB,, | Performed by: OBSTETRICS & GYNECOLOGY

## 2020-04-02 PROCEDURE — 99999 PR PBB SHADOW E&M-EST. PATIENT-LVL II: ICD-10-PCS | Mod: PBBFAC,,, | Performed by: OBSTETRICS & GYNECOLOGY

## 2020-04-02 PROCEDURE — 87481 CANDIDA DNA AMP PROBE: CPT | Mod: 59

## 2020-04-02 PROCEDURE — 99999 PR PBB SHADOW E&M-EST. PATIENT-LVL II: CPT | Mod: PBBFAC,,, | Performed by: OBSTETRICS & GYNECOLOGY

## 2020-04-02 NOTE — PROGRESS NOTES
Chief Complaint   Patient presents with    Routine Prenatal Visit     pressure       24 y.o., at 32w2d by Estimated Date of Delivery: 20    Complaints today: See below    ROS  OBSTETRICS:   Contractions See below   Bleeding N   Loss of fluid N   Fetal mvmnt Y  GASTRO:   Nausea N   Vomiting N      OB History    Para Term  AB Living   4 3 3 0 0 3   SAB TAB Ectopic Multiple Live Births   0 0 0 0 3      # Outcome Date GA Lbr Tom/2nd Weight Sex Delivery Anes PTL Lv   4 Current            3 Term 17 38w1d / 00:13 2.835 kg (6 lb 4 oz) M Vag-Spont EPI N BURT   2 Term 14 38w0d  3.402 kg (7 lb 8 oz) M Vag-Spont EPI N BURT   1 Term 11 37w0d  3.147 kg (6 lb 15 oz) M Vag-Spont EPI N BURT       Dating reviewed  Allergies and problem list reviewed and updated  Medical and surgical history reviewed  Prenatal labs reviewed and updated    PHYSICAL EXAM  /60   Wt 78.5 kg (173 lb 1 oz)   BMI 29.71 kg/m²     GENERAL: No acute distress  HEENT: Normocephalic  NEURO: Alert and oriented x3  PSYCH: Normal mood and affect  PULMONARY: Non-labored respiration; no tachypnea  ABD: Soft, gravid, nontender; no hernia or hepatosplenomegaly  EXT GENITALIA: Normal  URETHRA: Normal, no tenderness  URETHRAL MEATUS: Normal  BLADDER: Normal  VAGINA: Normal, +discharge  CERVIX: Normal  UTERUS: Enlarged, nontender  ADNEXA: Not assessed      ASSESSMENT AND PLAN    pregnancy Problems (from 19 to present)     Problem Noted Resolved    Pregnancy with poor reproductive history, no flu/TDAP/bot/patch 2019 by Joaquin Andrew MD No    Priority:  1 - High      Overview Signed 3/24/2020  4:27 PM by SILVIA Oakes MD     History of incompetent cervix requiring cerclage in prior pregnancy.         Cervical cerclage suture present, antepartum 2019 by La Galdamez MD No    Priority:  2       Sickle cell trait 10/14/2019 by Vanessa Davenport MD No    Priority:  3             32 weeks / Poor OB  history  - No bleeding or loss of fluid  - Re-schedule glucose screen and repeat CBC  Cerclage / Vaginal discharge  - Worsening pain and pressure over the past week  - About 6 contractions per day  - No bleeding  - On exam, no bleeding, no tension on cerclage, cervix is closed  - Some discharge and tenderness noted  - Affirm and GC/chlamydia.  Will treat based on results     labor precautions given  Follow-up: OB virtual visit in 2 weeks

## 2020-04-03 LAB
BACTERIAL VAGINOSIS DNA: NEGATIVE
C TRACH DNA SPEC QL NAA+PROBE: NOT DETECTED
CANDIDA GLABRATA DNA: NEGATIVE
CANDIDA KRUSEI DNA: NEGATIVE
CANDIDA RRNA VAG QL PROBE: NEGATIVE
N GONORRHOEA DNA SPEC QL NAA+PROBE: NOT DETECTED
T VAGINALIS RRNA GENITAL QL PROBE: NEGATIVE

## 2020-04-15 ENCOUNTER — OFFICE VISIT (OUTPATIENT)
Dept: OBSTETRICS AND GYNECOLOGY | Facility: CLINIC | Age: 25
End: 2020-04-15
Payer: MEDICAID

## 2020-04-15 DIAGNOSIS — O34.33 CERVICAL CERCLAGE SUTURE PRESENT IN THIRD TRIMESTER: ICD-10-CM

## 2020-04-15 DIAGNOSIS — O09.293 PREGNANCY WITH POOR REPRODUCTIVE HISTORY IN THIRD TRIMESTER: Primary | ICD-10-CM

## 2020-04-15 PROCEDURE — 86703 HIV-1/HIV-2 1 RESULT ANTBDY: CPT

## 2020-04-15 PROCEDURE — 85025 COMPLETE CBC W/AUTO DIFF WBC: CPT | Mod: 91

## 2020-04-15 PROCEDURE — 99213 OFFICE O/P EST LOW 20 MIN: CPT | Mod: TH,95,, | Performed by: OBSTETRICS & GYNECOLOGY

## 2020-04-15 PROCEDURE — 86592 SYPHILIS TEST NON-TREP QUAL: CPT

## 2020-04-15 PROCEDURE — 99213 PR OFFICE/OUTPT VISIT, EST, LEVL III, 20-29 MIN: ICD-10-PCS | Mod: TH,95,, | Performed by: OBSTETRICS & GYNECOLOGY

## 2020-04-15 NOTE — Clinical Note
Please schedule(1) OB clinic appt at [facility] in 2 weeks (cerclage removal)(2) Lab appt at [facility] for the same dayThanks.

## 2020-04-15 NOTE — PROGRESS NOTES
The patient location is: HOME  The chief complaint leading to consultation is: ROUTINE PRENATAL  Visit type: Virtual visit with synchronous audio and video  Total time spent with patient: 10 MINUTES  Each patient to whom he or she provides medical services by telemedicine is:  (1) informed of the relationship between the physician and patient and the respective role of any other health care provider with respect to management of the patient; and (2) notified that he or she may decline to receive medical services by telemedicine and may withdraw from such care at any time.    Chief Complaint   Patient presents with    Routine Prenatal Visit       25 y.o., at 34w1d by Estimated Date of Delivery: 20    HPI: Occasional contractions, not consistent.  No SOB, chest pressure, cough, loss of taste/smell, diarrhea, or fever.  No one in the household with similar symptoms.    ROS  OBSTETRICS:   Contractions As above   Bleeding N   Loss of fluid N   Fetal mvmnt Y  GASTRO:   Nausea N   Vomiting N      OB History    Para Term  AB Living   4 3 3 0 0 3   SAB TAB Ectopic Multiple Live Births   0 0 0 0 3      # Outcome Date GA Lbr Tom/2nd Weight Sex Delivery Anes PTL Lv   4 Current            3 Term 17 38w1d / 00:13 2.835 kg (6 lb 4 oz) M Vag-Spont EPI N BURT   2 Term 14 38w0d  3.402 kg (7 lb 8 oz) M Vag-Spont EPI N BURT   1 Term 11 37w0d  3.147 kg (6 lb 15 oz) M Vag-Spont EPI N BURT       Dating reviewed  Allergies and problem list reviewed and updated  Medical and surgical history reviewed  Prenatal labs reviewed and updated    PHYSICAL EXAM  No vitals.  Virtual visit.    GENERAL: No acute distress  HEENT: Normocephalic  NEURO: Alert and oriented x3  PSYCH: Normal mood and affect  PULMONARY: Non-labored respiration; no increased work of breathing      ASSESSMENT AND PLAN    pregnancy Problems (from 19 to present)     Problem Noted Resolved    Pregnancy with poor reproductive history, no  flu/TDAP/bot/patch 2019 by Joaquin Andrew MD No    Priority:  1 - High      Overview Signed 3/24/2020  4:27 PM by SILVIA Oakes MD     History of incompetent cervix requiring cerclage in prior pregnancy.         Cervical cerclage suture present, antepartum 2019 by La Galdamez MD No    Priority:  2       Sickle cell trait 10/14/2019 by Vanessa Davenport MD No    Priority:  3             34 weeks / Poor OB history  - Occasional contractions.   labor precautions given.  - GBS on return to clinic.  Schedule 3rd trimester labs.  Will defer glucose screen due to late gestational age.  Cerclage  - No bleeding  - Will remove on return to clinic in 2 weeks       labor precautions given  Follow-up: OB clinic appt in 2 weeks

## 2020-04-30 ENCOUNTER — HOSPITAL ENCOUNTER (OUTPATIENT)
Facility: OTHER | Age: 25
LOS: 1 days | Discharge: HOME | End: 2020-04-30
Attending: OBSTETRICS & GYNECOLOGY | Admitting: OBSTETRICS & GYNECOLOGY
Payer: MEDICAID

## 2020-04-30 ENCOUNTER — ANESTHESIA (OUTPATIENT)
Dept: OBSTETRICS AND GYNECOLOGY | Facility: OTHER | Age: 25
End: 2020-04-30
Payer: MEDICAID

## 2020-04-30 ENCOUNTER — ANESTHESIA EVENT (OUTPATIENT)
Dept: OBSTETRICS AND GYNECOLOGY | Facility: OTHER | Age: 25
End: 2020-04-30
Payer: MEDICAID

## 2020-04-30 ENCOUNTER — ROUTINE PRENATAL (OUTPATIENT)
Dept: OBSTETRICS AND GYNECOLOGY | Facility: CLINIC | Age: 25
End: 2020-04-30
Payer: MEDICAID

## 2020-04-30 VITALS
DIASTOLIC BLOOD PRESSURE: 69 MMHG | OXYGEN SATURATION: 100 % | RESPIRATION RATE: 18 BRPM | TEMPERATURE: 98 F | SYSTOLIC BLOOD PRESSURE: 110 MMHG | HEART RATE: 84 BPM

## 2020-04-30 VITALS
BODY MASS INDEX: 30.58 KG/M2 | SYSTOLIC BLOOD PRESSURE: 110 MMHG | WEIGHT: 178.13 LBS | DIASTOLIC BLOOD PRESSURE: 80 MMHG

## 2020-04-30 DIAGNOSIS — O34.30 CERVICAL CERCLAGE SUTURE PRESENT, ANTEPARTUM: ICD-10-CM

## 2020-04-30 DIAGNOSIS — Z98.890 HISTORY OF CERVICAL CERCLAGE: Primary | ICD-10-CM

## 2020-04-30 DIAGNOSIS — O09.299 PREGNANCY WITH POOR REPRODUCTIVE HISTORY, ANTEPARTUM: Primary | ICD-10-CM

## 2020-04-30 DIAGNOSIS — R30.0 DYSURIA: ICD-10-CM

## 2020-04-30 DIAGNOSIS — O34.33 CERVICAL CERCLAGE SUTURE PRESENT IN THIRD TRIMESTER: ICD-10-CM

## 2020-04-30 LAB
ABO + RH BLD: NORMAL
BASOPHILS # BLD AUTO: 0.02 K/UL (ref 0–0.2)
BASOPHILS # BLD AUTO: 0.03 K/UL (ref 0–0.2)
BASOPHILS NFR BLD: 0.2 % (ref 0–1.9)
BASOPHILS NFR BLD: 0.3 % (ref 0–1.9)
BILIRUB SERPL-MCNC: NEGATIVE MG/DL
BLD GP AB SCN CELLS X3 SERPL QL: NORMAL
BLOOD URINE, POC: NEGATIVE
COLOR, POC UA: NORMAL
DIFFERENTIAL METHOD: ABNORMAL
DIFFERENTIAL METHOD: ABNORMAL
EOSINOPHIL # BLD AUTO: 0.1 K/UL (ref 0–0.5)
EOSINOPHIL # BLD AUTO: 0.1 K/UL (ref 0–0.5)
EOSINOPHIL NFR BLD: 1.3 % (ref 0–8)
EOSINOPHIL NFR BLD: 1.4 % (ref 0–8)
ERYTHROCYTE [DISTWIDTH] IN BLOOD BY AUTOMATED COUNT: 13.2 % (ref 11.5–14.5)
ERYTHROCYTE [DISTWIDTH] IN BLOOD BY AUTOMATED COUNT: 13.3 % (ref 11.5–14.5)
GLUCOSE UR QL STRIP: NORMAL
HCT VFR BLD AUTO: 26.5 % (ref 37–48.5)
HCT VFR BLD AUTO: 29.1 % (ref 37–48.5)
HGB BLD-MCNC: 8.5 G/DL (ref 12–16)
HGB BLD-MCNC: 9.2 G/DL (ref 12–16)
IMM GRANULOCYTES # BLD AUTO: 0.33 K/UL (ref 0–0.04)
IMM GRANULOCYTES # BLD AUTO: 0.34 K/UL (ref 0–0.04)
IMM GRANULOCYTES NFR BLD AUTO: 3.3 % (ref 0–0.5)
IMM GRANULOCYTES NFR BLD AUTO: 3.5 % (ref 0–0.5)
KETONES UR QL STRIP: 1
LEUKOCYTE ESTERASE URINE, POC: NORMAL
LYMPHOCYTES # BLD AUTO: 1.4 K/UL (ref 1–4.8)
LYMPHOCYTES # BLD AUTO: 1.5 K/UL (ref 1–4.8)
LYMPHOCYTES NFR BLD: 14.6 % (ref 18–48)
LYMPHOCYTES NFR BLD: 15 % (ref 18–48)
MCH RBC QN AUTO: 26.2 PG (ref 27–31)
MCH RBC QN AUTO: 27.4 PG (ref 27–31)
MCHC RBC AUTO-ENTMCNC: 31.6 G/DL (ref 32–36)
MCHC RBC AUTO-ENTMCNC: 32.1 G/DL (ref 32–36)
MCV RBC AUTO: 82 FL (ref 82–98)
MCV RBC AUTO: 87 FL (ref 82–98)
MONOCYTES # BLD AUTO: 0.7 K/UL (ref 0.3–1)
MONOCYTES # BLD AUTO: 0.8 K/UL (ref 0.3–1)
MONOCYTES NFR BLD: 6.9 % (ref 4–15)
MONOCYTES NFR BLD: 7.7 % (ref 4–15)
NEUTROPHILS # BLD AUTO: 7.1 K/UL (ref 1.8–7.7)
NEUTROPHILS # BLD AUTO: 7.3 K/UL (ref 1.8–7.7)
NEUTROPHILS NFR BLD: 72.4 % (ref 38–73)
NEUTROPHILS NFR BLD: 73.4 % (ref 38–73)
NITRITE, POC UA: NEGATIVE
NRBC BLD-RTO: 0 /100 WBC
NRBC BLD-RTO: 0 /100 WBC
PH, POC UA: 7
PLATELET # BLD AUTO: 127 K/UL (ref 150–350)
PLATELET # BLD AUTO: 136 K/UL (ref 150–350)
PMV BLD AUTO: 13.5 FL (ref 9.2–12.9)
PMV BLD AUTO: 13.9 FL (ref 9.2–12.9)
PROTEIN, POC: NORMAL
RBC # BLD AUTO: 3.24 M/UL (ref 4–5.4)
RBC # BLD AUTO: 3.36 M/UL (ref 4–5.4)
SARS-COV-2 RDRP RESP QL NAA+PROBE: NEGATIVE
SPECIFIC GRAVITY, POC UA: 1
UROBILINOGEN, POC UA: NORMAL
WBC # BLD AUTO: 10.11 K/UL (ref 3.9–12.7)
WBC # BLD AUTO: 9.71 K/UL (ref 3.9–12.7)

## 2020-04-30 PROCEDURE — 63600175 PHARM REV CODE 636 W HCPCS: Performed by: STUDENT IN AN ORGANIZED HEALTH CARE EDUCATION/TRAINING PROGRAM

## 2020-04-30 PROCEDURE — 86850 RBC ANTIBODY SCREEN: CPT

## 2020-04-30 PROCEDURE — 37000009 HC ANESTHESIA EA ADD 15 MINS: Performed by: OBSTETRICS & GYNECOLOGY

## 2020-04-30 PROCEDURE — 59871 REMOVE CERCLAGE SUTURE: CPT | Mod: ,,, | Performed by: OBSTETRICS & GYNECOLOGY

## 2020-04-30 PROCEDURE — 36004722: Performed by: OBSTETRICS & GYNECOLOGY

## 2020-04-30 PROCEDURE — 25000003 PHARM REV CODE 250

## 2020-04-30 PROCEDURE — 99285 EMERGENCY DEPT VISIT HI MDM: CPT | Mod: 25,27

## 2020-04-30 PROCEDURE — 36004723: Performed by: OBSTETRICS & GYNECOLOGY

## 2020-04-30 PROCEDURE — 87086 URINE CULTURE/COLONY COUNT: CPT

## 2020-04-30 PROCEDURE — 71000033 HC RECOVERY, INTIAL HOUR: Performed by: OBSTETRICS & GYNECOLOGY

## 2020-04-30 PROCEDURE — 85025 COMPLETE CBC W/AUTO DIFF WBC: CPT

## 2020-04-30 PROCEDURE — G0378 HOSPITAL OBSERVATION PER HR: HCPCS

## 2020-04-30 PROCEDURE — 00940 ANES VAGINAL PX NOS: CPT | Performed by: OBSTETRICS & GYNECOLOGY

## 2020-04-30 PROCEDURE — 99213 PR OFFICE/OUTPT VISIT, EST, LEVL III, 20-29 MIN: ICD-10-PCS | Mod: TH,S$PBB,, | Performed by: OBSTETRICS & GYNECOLOGY

## 2020-04-30 PROCEDURE — D9220A PRA ANESTHESIA: ICD-10-PCS | Mod: ,,, | Performed by: ANESTHESIOLOGY

## 2020-04-30 PROCEDURE — 87081 CULTURE SCREEN ONLY: CPT

## 2020-04-30 PROCEDURE — 99999 PR PBB SHADOW E&M-EST. PATIENT-LVL II: ICD-10-PCS | Mod: PBBFAC,,, | Performed by: OBSTETRICS & GYNECOLOGY

## 2020-04-30 PROCEDURE — D9220A PRA ANESTHESIA: Mod: ,,, | Performed by: ANESTHESIOLOGY

## 2020-04-30 PROCEDURE — U0002 COVID-19 LAB TEST NON-CDC: HCPCS

## 2020-04-30 PROCEDURE — 59025 FETAL NON-STRESS TEST: CPT

## 2020-04-30 PROCEDURE — 59871 PR REMOVE CERCLAGE SUTURE: ICD-10-PCS | Mod: ,,, | Performed by: OBSTETRICS & GYNECOLOGY

## 2020-04-30 PROCEDURE — 99213 OFFICE O/P EST LOW 20 MIN: CPT | Mod: TH,S$PBB,, | Performed by: OBSTETRICS & GYNECOLOGY

## 2020-04-30 PROCEDURE — 25000003 PHARM REV CODE 250: Performed by: OBSTETRICS & GYNECOLOGY

## 2020-04-30 PROCEDURE — S0028 INJECTION, FAMOTIDINE, 20 MG: HCPCS

## 2020-04-30 PROCEDURE — 71000039 HC RECOVERY, EACH ADD'L HOUR: Performed by: OBSTETRICS & GYNECOLOGY

## 2020-04-30 PROCEDURE — 99212 OFFICE O/P EST SF 10 MIN: CPT | Mod: PBBFAC,TH,PO,25 | Performed by: OBSTETRICS & GYNECOLOGY

## 2020-04-30 PROCEDURE — 99999 PR PBB SHADOW E&M-EST. PATIENT-LVL II: CPT | Mod: PBBFAC,,, | Performed by: OBSTETRICS & GYNECOLOGY

## 2020-04-30 PROCEDURE — 37000008 HC ANESTHESIA 1ST 15 MINUTES: Performed by: OBSTETRICS & GYNECOLOGY

## 2020-04-30 RX ORDER — FENTANYL CITRATE 50 UG/ML
INJECTION, SOLUTION INTRAMUSCULAR; INTRAVENOUS
Status: DISCONTINUED
Start: 2020-04-30 | End: 2020-04-30 | Stop reason: WASHOUT

## 2020-04-30 RX ORDER — DIPHENHYDRAMINE HYDROCHLORIDE 50 MG/ML
25 INJECTION INTRAMUSCULAR; INTRAVENOUS EVERY 4 HOURS PRN
Status: DISCONTINUED | OUTPATIENT
Start: 2020-04-30 | End: 2020-04-30 | Stop reason: HOSPADM

## 2020-04-30 RX ORDER — SIMETHICONE 80 MG
1 TABLET,CHEWABLE ORAL EVERY 6 HOURS PRN
Status: DISCONTINUED | OUTPATIENT
Start: 2020-04-30 | End: 2020-04-30 | Stop reason: HOSPADM

## 2020-04-30 RX ORDER — ACETAMINOPHEN 500 MG
1000 TABLET ORAL ONCE
Status: COMPLETED | OUTPATIENT
Start: 2020-04-30 | End: 2020-04-30

## 2020-04-30 RX ORDER — MIDAZOLAM HYDROCHLORIDE 1 MG/ML
INJECTION, SOLUTION INTRAMUSCULAR; INTRAVENOUS
Status: DISCONTINUED | OUTPATIENT
Start: 2020-04-30 | End: 2020-04-30

## 2020-04-30 RX ORDER — AMOXICILLIN 250 MG
1 CAPSULE ORAL NIGHTLY PRN
Status: DISCONTINUED | OUTPATIENT
Start: 2020-04-30 | End: 2020-04-30 | Stop reason: HOSPADM

## 2020-04-30 RX ORDER — SODIUM CHLORIDE, SODIUM LACTATE, POTASSIUM CHLORIDE, CALCIUM CHLORIDE 600; 310; 30; 20 MG/100ML; MG/100ML; MG/100ML; MG/100ML
INJECTION, SOLUTION INTRAVENOUS CONTINUOUS PRN
Status: DISCONTINUED | OUTPATIENT
Start: 2020-04-30 | End: 2020-04-30

## 2020-04-30 RX ORDER — BUPIVACAINE HYDROCHLORIDE 2.5 MG/ML
INJECTION, SOLUTION EPIDURAL; INFILTRATION; INTRACAUDAL
Status: DISCONTINUED
Start: 2020-04-30 | End: 2020-04-30 | Stop reason: HOSPADM

## 2020-04-30 RX ORDER — FAMOTIDINE 10 MG/ML
20 INJECTION INTRAVENOUS 2 TIMES DAILY
Status: DISCONTINUED | OUTPATIENT
Start: 2020-04-30 | End: 2020-04-30

## 2020-04-30 RX ORDER — SODIUM CHLORIDE 9 MG/ML
INJECTION, SOLUTION INTRAVENOUS CONTINUOUS
Status: DISCONTINUED | OUTPATIENT
Start: 2020-04-30 | End: 2020-04-30 | Stop reason: HOSPADM

## 2020-04-30 RX ORDER — ONDANSETRON 8 MG/1
8 TABLET, ORALLY DISINTEGRATING ORAL EVERY 8 HOURS PRN
Status: DISCONTINUED | OUTPATIENT
Start: 2020-04-30 | End: 2020-04-30 | Stop reason: HOSPADM

## 2020-04-30 RX ORDER — PRENATAL WITH FERROUS FUM AND FOLIC ACID 3080; 920; 120; 400; 22; 1.84; 3; 20; 10; 1; 12; 200; 27; 25; 2 [IU]/1; [IU]/1; MG/1; [IU]/1; MG/1; MG/1; MG/1; MG/1; MG/1; MG/1; UG/1; MG/1; MG/1; MG/1; MG/1
1 TABLET ORAL DAILY
Status: DISCONTINUED | OUTPATIENT
Start: 2020-04-30 | End: 2020-04-30 | Stop reason: HOSPADM

## 2020-04-30 RX ORDER — FENTANYL CITRATE 50 UG/ML
INJECTION, SOLUTION INTRAMUSCULAR; INTRAVENOUS
Status: DISCONTINUED | OUTPATIENT
Start: 2020-04-30 | End: 2020-04-30

## 2020-04-30 RX ORDER — FAMOTIDINE 10 MG/ML
INJECTION INTRAVENOUS
Status: COMPLETED
Start: 2020-04-30 | End: 2020-04-30

## 2020-04-30 RX ORDER — SODIUM CITRATE AND CITRIC ACID MONOHYDRATE 334; 500 MG/5ML; MG/5ML
SOLUTION ORAL
Status: COMPLETED
Start: 2020-04-30 | End: 2020-04-30

## 2020-04-30 RX ORDER — OXYCODONE HYDROCHLORIDE 5 MG/1
5 TABLET ORAL ONCE
Status: COMPLETED | OUTPATIENT
Start: 2020-04-30 | End: 2020-04-30

## 2020-04-30 RX ORDER — SODIUM CITRATE AND CITRIC ACID MONOHYDRATE 334; 500 MG/5ML; MG/5ML
30 SOLUTION ORAL ONCE
Status: COMPLETED | OUTPATIENT
Start: 2020-04-30 | End: 2020-04-30

## 2020-04-30 RX ORDER — FAMOTIDINE 10 MG/ML
20 INJECTION INTRAVENOUS ONCE
Status: COMPLETED | OUTPATIENT
Start: 2020-04-30 | End: 2020-04-30

## 2020-04-30 RX ORDER — DIPHENHYDRAMINE HCL 25 MG
25 CAPSULE ORAL EVERY 4 HOURS PRN
Status: DISCONTINUED | OUTPATIENT
Start: 2020-04-30 | End: 2020-04-30 | Stop reason: HOSPADM

## 2020-04-30 RX ORDER — ACETAMINOPHEN 325 MG/1
650 TABLET ORAL EVERY 6 HOURS PRN
Status: DISCONTINUED | OUTPATIENT
Start: 2020-04-30 | End: 2020-04-30 | Stop reason: HOSPADM

## 2020-04-30 RX ADMIN — OXYCODONE HYDROCHLORIDE 5 MG: 5 TABLET ORAL at 11:04

## 2020-04-30 RX ADMIN — SODIUM CHLORIDE, SODIUM LACTATE, POTASSIUM CHLORIDE, AND CALCIUM CHLORIDE: 600; 310; 30; 20 INJECTION, SOLUTION INTRAVENOUS at 12:04

## 2020-04-30 RX ADMIN — SODIUM CITRATE AND CITRIC ACID MONOHYDRATE 30 ML: 500; 334 SOLUTION ORAL at 03:04

## 2020-04-30 RX ADMIN — MIDAZOLAM HYDROCHLORIDE 2 MG: 1 INJECTION, SOLUTION INTRAMUSCULAR; INTRAVENOUS at 03:04

## 2020-04-30 RX ADMIN — FENTANYL CITRATE 25 MCG: 50 INJECTION, SOLUTION INTRAMUSCULAR; INTRAVENOUS at 03:04

## 2020-04-30 RX ADMIN — SODIUM CITRATE AND CITRIC ACID MONOHYDRATE 30 ML: 334; 500 SOLUTION ORAL at 03:04

## 2020-04-30 RX ADMIN — FAMOTIDINE 20 MG: 10 INJECTION INTRAVENOUS at 03:04

## 2020-04-30 RX ADMIN — ACETAMINOPHEN 1000 MG: 500 TABLET ORAL at 11:04

## 2020-04-30 NOTE — ED PROVIDER NOTES
Encounter Date: 2020       History   No chief complaint on file.    HPI   Juanita Lipscomb is a 25 y.o. [id number] at 36w2d presents from Dr. Oakes's office for pain when attempting to room her cervical cerclage. Pt is here for pain control and removal.      This IUP is complicated by sickle cell trait, history of incompetent cervix (requiring cerclage in prior and this pregnancy) .  Patient denies contractions, denies vaginal bleeding, denies LOF.   Fetal Movement: normal.     Review of patient's allergies indicates:   Allergen Reactions    Watermelon Shortness Of Breath, Itching, Swelling and Edema     Chest pain, angioedema, SOB, after eating watermelon tonight     Past Medical History:   Diagnosis Date    Incompetent cervix     Rh incompatibility     Sickle cell trait     Thyroid disease     Hypothyroidism     Past Surgical History:   Procedure Laterality Date    CERVICAL CERCLAGE      CERVICAL CERCLAGE N/A 2019    Procedure: CERCLAGE, CERVIX;  Surgeon: Atul Mast MD;  Location: [facility] L&D;  Service: OB/GYN;  Laterality: N/A;  alejandro would like 10am     Family History   Problem Relation Age of Onset    Hypertension Mother     Breast cancer Neg Hx     Eclampsia Neg Hx      Social History     Tobacco Use    Smoking status: Never Smoker    Smokeless tobacco: Never Used   Substance Use Topics    Alcohol use: No    Drug use: No     Review of Systems   Constitutional: Negative for fever.   HENT: Negative for sore throat.    Eyes: Negative for visual disturbance.   Respiratory: Negative for cough and shortness of breath.    Cardiovascular: Negative for chest pain.   Gastrointestinal: Negative for nausea.   Genitourinary: Negative for dysuria and vaginal bleeding.   Musculoskeletal: Negative for back pain.   Skin: Negative for rash.   Neurological: Negative for weakness and headaches.   Hematological: Does not bruise/bleed easily.       Physical Exam     Initial Vitals [20 1102]    BP Pulse Resp Temp SpO2   104/67 88 18 -- 100 %      MAP       --         Physical Exam    Vitals reviewed.  Constitutional: Vital signs are normal. She appears well-developed and well-nourished. Breathing: Normal.   HENT:   Head: Normocephalic and atraumatic.   Eyes: EOM are normal.   Neck: Normal range of motion.   Cardiovascular: Normal rate, intact distal pulses and normal pulses.   Pulmonary/Chest:   Normal work of breathing   Abdominal: Soft. She exhibits no distension. There is no tenderness.   Musculoskeletal: Normal range of motion. She exhibits no edema.   Neurological: She is alert and oriented to person, place, and time. She has normal strength.   Skin: Skin is warm and dry.   Psychiatric: Her behavior is normal.     OB LABOR EXAM:                       Comments: SSE: speculum placed and patient having severe pain, sutures identified but knot  Unable to be seen. Attempted a second time by hospitalist and patient in pain and asked for exam to stop.       ED Course   Obtain Fetal nonstress test (NST)  Date/Time: 4/30/2020 1:49 PM  Performed by: Sarah Loera MD  Authorized by: Monisha Rdz MD     Nonstress Test:     Variability:  6-25 BPM    Decelerations:  None    Accelerations:  15 bpm    Baseline:  140    Uterine Irritability: Yes      Contractions:  Not present  Biophysical Profile:     Nonstress Test Interpretation: reactive      Overall Impression:  Reassuring  Post-procedure:     Patient tolerance:  Patient tolerated the procedure well with no immediate complications      Labs Reviewed   POCT URINALYSIS, DIPSTICK OR TABLET REAGENT, AUTOMATED, WITH MICROSCOP          Imaging Results    None          Medical Decision Making:   ED Management:  VSS  NST reactive and reassuring, no contractions  Patient given percalon for pain management  After 30 minutes, patient still unable to tolerate speculum exam with cerclage removal  Will admit to outpatient obgyn to have patient taken back to  OR for the cerclage removal  Pt was consented for cerclage removal, blood consent and labor and delivery consent  Pt expressed understanding, has not eaten today.  Other:   I have discussed this case with another health care provider.                   ED Course as of Apr 30 1347   Thu Apr 30, 2020   1211 Patient unable to tolerate cerclage removal. Knot at 12 oclock but deep in edematous cervix and difficulty visualizing due to lateral walls impinging on view.    [AV]      ED Course User Index  [AV] Monisha Rdz MD                Clinical Impression:       ICD-10-CM ICD-9-CM   1. Cervical cerclage suture present, antepartum O34.30 654.53         Disposition:   Disposition: Placed in Observation  Condition: Stable        Radha Lorea MD  OBGYN PGY-1                 Sarah Loera MD  Resident  04/30/20 135

## 2020-04-30 NOTE — NURSING
Pt discharged home after cerclage removal.  Pt able to ambulate and void. Pt states she feeling great. Pt has ride waiting on her downstairs.  Pt educated on labor precautions and when to call the dr.  Pt verbalized understanding.

## 2020-04-30 NOTE — ANESTHESIA POSTPROCEDURE EVALUATION
Anesthesia Post Evaluation    Patient: Juanita Lipscomb    Procedure(s) Performed: Procedure(s) (LRB):  CERCLAGE, CERVIX (N/A)    Final Anesthesia Type: MAC    Patient location during evaluation: labor & delivery  Patient participation: Yes- Able to Participate  Level of consciousness: awake and alert and oriented  Post-procedure vital signs: reviewed and stable  Pain management: adequate  Airway patency: patent  BUSTER mitigation strategies: Multimodal analgesia  PONV status at discharge: No PONV  Anesthetic complications: no      Cardiovascular status: hemodynamically stable  Respiratory status: unassisted, spontaneous ventilation and room air  Hydration status: euvolemic  Follow-up not needed.          Vitals Value Taken Time   /71 4/30/2020  3:45 PM   Temp 36.4 °C (97.5 °F) 4/30/2020  3:46 PM   Pulse 90 4/30/2020  3:50 PM   Resp 18 4/30/2020 11:02 AM   SpO2 100 % 4/30/2020  3:50 PM   Vitals shown include unvalidated device data.      No case tracking events are documented in the log.      Pain/Dewayne Score: Pain Rating Prior to Med Admin: 7 (4/30/2020 11:05 AM)

## 2020-04-30 NOTE — PROGRESS NOTES
Chief Complaint   Patient presents with    Routine Prenatal Visit     pt states she thinks she has a uti        25 y.o., at 36w2d by Estimated Date of Delivery: 20    Complaints today: Increasing pelvic pressure over the past couple weeks.  No contractions or bleeding.  She also notes some dysuria recently.    ROS  OBSTETRICS:   Contractions As above   Bleeding As above   Loss of fluid N   Fetal mvmnt Y  GASTRO:   Nausea N   Vomiting N      OB History    Para Term  AB Living   4 3 3 0 0 3   SAB TAB Ectopic Multiple Live Births   0 0 0 0 3      # Outcome Date GA Lbr Tom/2nd Weight Sex Delivery Anes PTL Lv   4 Current            3 Term 17 38w1d / 00:13 2.835 kg (6 lb 4 oz) M Vag-Spont EPI N BURT   2 Term 14 38w0d  3.402 kg (7 lb 8 oz) M Vag-Spont EPI N BURT   1 Term 11 37w0d  3.147 kg (6 lb 15 oz) M Vag-Spont EPI N BURT       Dating reviewed  Allergies and problem list reviewed and updated  Medical and surgical history reviewed  Prenatal labs reviewed and updated    PHYSICAL EXAM  /80   Wt 80.8 kg (178 lb 2.1 oz)   BMI 30.58 kg/m²     GENERAL: No acute distress  HEENT: Normocephalic  NEURO: Alert and oriented x3  PSYCH: Normal mood and affect  PULMONARY: Non-labored respiration; no tachypnea  ABD: Soft, gravid, nontender; no hernia or hepatosplenomegaly    ASSESSMENT AND PLAN    pregnancy Problems (from 19 to present)     Problem Noted Resolved    Pregnancy with poor reproductive history, no flu/TDAP/bot/patch 2019 by Joaquin Andrew MD No    Priority:  1 - High      Overview Signed 3/24/2020  4:27 PM by SILVIA Oakes MD     History of incompetent cervix requiring cerclage in prior pregnancy.         Cervical cerclage suture present, antepartum 2019 by La Galdamez MD No    Priority:  2       Sickle cell trait 10/14/2019 by Vanessa Davenport MD No    Priority:  3             36 weeks / Poor OB history  - GBS and 3rd trimester labs  today  Cerclage  - Increasing pelvic pressure but no contractions  - Attempted removal in clinic today, but pt stopped after she experienced multiple contractions following displacement of cervix  - Cerclage adequately visualized  - To [facility] for repeat attempt as patient may need some medication to facilitate removal  Dysuria - Urine culture.  Will treat based on results.     labor precautions given  Follow-up: OB virtual visit in 1 week

## 2020-04-30 NOTE — ANESTHESIA PREPROCEDURE EVALUATION
Juanita Lipscomb is a 25 y.o. female  at 36w2d presenting from OBGYNs office for pain when attempting to room her cervical cerclage. Pt is here for pain control and removal.      This IUP is complicated by sickle cell trait, history of incompetent cervix (requiring cerclage in prior and this pregnancy) .  Patient denies contractions, denies vaginal bleeding, denies LOF.     OB History    Para Term  AB Living   4 3 3 0 0 3   SAB TAB Ectopic Multiple Live Births   0 0 0 0 3      # Outcome Date GA Lbr Tom/2nd Weight Sex Delivery Anes PTL Lv   4 Current            3 Term 17 38w1d / 00:13 2.835 kg (6 lb 4 oz) M Vag-Spont EPI N BURT   2 Term 14 38w0d  3.402 kg (7 lb 8 oz) M Vag-Spont EPI N BURT   1 Term 11 37w0d  3.147 kg (6 lb 15 oz) M Vag-Spont EPI N BURT       Wt Readings from Last 1 Encounters:   20 0950 80.8 kg (178 lb 2.1 oz)       BP Readings from Last 3 Encounters:   20 101/69   20 110/80   20 112/60       Patient Active Problem List   Diagnosis    Sickle cell trait    Pregnancy with poor reproductive history, no flu/TDAP/bot/patch    Cervical cerclage suture present, antepartum    Cervical cerclage suture present       Past Surgical History:   Procedure Laterality Date    CERVICAL CERCLAGE      CERVICAL CERCLAGE N/A 2019    Procedure: CERCLAGE, CERVIX;  Surgeon: Atul Mast MD;  Location: [facility] L&D;  Service: OB/GYN;  Laterality: N/A;  alejandro would like 10am       Social History     Socioeconomic History    Marital status: Single     Spouse name: Not on file    Number of children: Not on file    Years of education: Not on file    Highest education level: Not on file   Occupational History    Not on file   Social Needs    Financial resource strain: Not on file    Food insecurity:     Worry: Not on file     Inability: Not on file    Transportation needs:     Medical: Not on file     Non-medical: Not on file   Tobacco Use     Smoking status: Never Smoker    Smokeless tobacco: Never Used   Substance and Sexual Activity    Alcohol use: No    Drug use: No    Sexual activity: Yes   Lifestyle    Physical activity:     Days per week: Not on file     Minutes per session: Not on file    Stress: Not on file   Relationships    Social connections:     Talks on phone: Not on file     Gets together: Not on file     Attends Restoration service: Not on file     Active member of club or organization: Not on file     Attends meetings of clubs or organizations: Not on file     Relationship status: Not on file   Other Topics Concern    Not on file   Social History Narrative    Not on file         Chemistry    No results found for: NA, K, CL, CO2, BUN, CREATININE, GLU No results found for: CALCIUM, ALKPHOS, AST, ALT, BILITOT, ESTGFRAFRICA, EGFRNONAA         Lab Results   Component Value Date    WBC 10.11 04/30/2020    HGB 8.5 (L) 04/30/2020    HCT 26.5 (L) 04/30/2020    MCV 82 04/30/2020     (L) 04/30/2020       No results for input(s): PT, INR, PROTIME, APTT in the last 72 hours.                                                                                                                           04/30/2020  Juanita Lipscomb is a 25 y.o., female.    Anesthesia Evaluation    I have reviewed the Patient Summary Reports.     I have reviewed the Nursing Notes.   I have reviewed the Medications.     Review of Systems  Anesthesia Hx:  No problems with previous Anesthesia    Social:  Non-Smoker    Hematology/Oncology:         -- Anemia:   Cardiovascular:  Cardiovascular Normal  Denies Hypertension.     Pulmonary:  Pulmonary Normal  Denies COPD.  Denies Asthma.    Hepatic/GI:  Hepatic/GI Normal    Musculoskeletal:  Musculoskeletal Normal  Denies Spine Disorders    Neurological:  Neurology Normal Denies Seizures.    Endocrine:   Denies Diabetes. Hypothyroidism        Physical Exam  General:  Well nourished    Airway/Jaw/Neck:  Airway Findings: Mouth  Opening: Normal Tongue: Normal  General Airway Assessment: Adult  Mallampati: II  Improves to I with phonation.  TM Distance: Normal, at least 6 cm      Dental:  Dental Findings: Upper braces, Lower braces   Chest/Lungs:  Chest/Lungs Findings: Clear to auscultation, Normal Respiratory Rate     Heart/Vascular:  Heart Findings: Rate: Normal  Rhythm: Regular Rhythm  Sounds: Normal        Mental Status:  Mental Status Findings:  Cooperative, Alert and Oriented         Anesthesia Plan  Type of Anesthesia, risks & benefits discussed:  Anesthesia Type:  general, MAC, spinal  Patient's Preference:   Intra-op Monitoring Plan: standard ASA monitors  Intra-op Monitoring Plan Comments:   Post Op Pain Control Plan: multimodal analgesia, IV/PO Opioids PRN and per primary service following discharge from PACU  Post Op Pain Control Plan Comments:   Induction:   IV  Beta Blocker:  Patient is not currently on a Beta-Blocker (No further documentation required).       Informed Consent: Patient understands risks and agrees with Anesthesia plan.  Questions answered. Anesthesia consent signed with patient.  ASA Score: 2     Day of Surgery Review of History & Physical:    H&P update referred to the provider.         Ready For Surgery From Anesthesia Perspective.

## 2020-04-30 NOTE — H&P
HISTORY AND PHYSICAL                                                OBSTETRICS          Subjective:      Juanita Lipscomb is a 25 y.o. [id number] at 36w2d presents from Dr. Oakes's office for pain when attempting to room her cervical cerclage. Pt is here for pain control and removal.       This IUP is complicated by sickle cell trait, history of incompetent cervix (requiring cerclage in prior and this pregnancy) .  Patient denies contractions, denies vaginal bleeding, denies LOF.   Fetal Movement: normal.     Review of Systems   Constitutional: Negative for fever.   HENT: Negative for sore throat.    Eyes: Negative for visual disturbance.   Respiratory: Negative for cough and shortness of breath.    Cardiovascular: Negative for chest pain.   Gastrointestinal: Negative for nausea.   Genitourinary: Negative for dysuria and vaginal bleeding.   Musculoskeletal: Negative for back pain.   Skin: Negative for rash.   Neurological: Negative for weakness and headaches.   Hematological: Does not bruise/bleed easily.    PMHx:   Past Medical History:   Diagnosis Date    Incompetent cervix     Rh incompatibility     Sickle cell trait     Thyroid disease     Hypothyroidism       PSHx:   Past Surgical History:   Procedure Laterality Date    CERVICAL CERCLAGE      CERVICAL CERCLAGE N/A 2019    Procedure: CERCLAGE, CERVIX;  Surgeon: Atul Mast MD;  Location: [facility] [place] L&D;  Service: OB/GYN;  Laterality: N/A;  alejandro would like 10am       All:   Review of patient's allergies indicates:   Allergen Reactions    Watermelon Shortness Of Breath, Itching, Swelling and Edema     Chest pain, angioedema, SOB, after eating watermelon tonight       Meds:   Medications Prior to Admission   Medication Sig Dispense Refill Last Dose    famotidine (PEPCID) 40 MG tablet Take 1 tablet (40 mg total) by mouth every evening. 30 tablet 2 Taking    fluticasone propionate (FLONASE) 50 mcg/actuation nasal spray U 1 SPRAY IEN ONCE D  FOR ALLERGIES   Taking    ondansetron (ZOFRAN-ODT) 8 MG TbDL Take 1 tablet (8 mg total) by mouth every 6 (six) hours as needed. 60 tablet 3 Taking    PRENATAL VIT #76/IRON,CARB/FA (PNV 29-1 ORAL) Take 1 tablet by mouth once daily.   Taking    prenatal92-iron-folate8-ps-dha (ENBRACE HR) 1.5 mg iron- 8.73 mg-6.4 mg CpID Take 1 tablet by mouth once daily. 90 each 2 Taking    promethazine (PHENERGAN) 25 MG tablet Take 1 tablet (25 mg total) by mouth every 6 (six) hours as needed for Nausea. 60 tablet 3 Taking       SH:   Social History     Socioeconomic History    Marital status: Single     Spouse name: Not on file    Number of children: Not on file    Years of education: Not on file    Highest education level: Not on file   Occupational History    Not on file   Social Needs    Financial resource strain: Not on file    Food insecurity:     Worry: Not on file     Inability: Not on file    Transportation needs:     Medical: Not on file     Non-medical: Not on file   Tobacco Use    Smoking status: Never Smoker    Smokeless tobacco: Never Used   Substance and Sexual Activity    Alcohol use: No    Drug use: No    Sexual activity: Yes   Lifestyle    Physical activity:     Days per week: Not on file     Minutes per session: Not on file    Stress: Not on file   Relationships    Social connections:     Talks on phone: Not on file     Gets together: Not on file     Attends Sabianism service: Not on file     Active member of club or organization: Not on file     Attends meetings of clubs or organizations: Not on file     Relationship status: Not on file   Other Topics Concern    Not on file   Social History Narrative    Not on file       FH:   Family History   Problem Relation Age of Onset    Hypertension Mother     Breast cancer Neg Hx     Eclampsia Neg Hx        OBHx:   OB History    Para Term  AB Living   4 3 3 0 0 3   SAB TAB Ectopic Multiple Live Births   0 0 0 0 3      # Outcome Date GA  Lbr Tom/2nd Weight Sex Delivery Anes PTL Lv   4 Current            3 Term 17 38w1d / 00:13 2.835 kg (6 lb 4 oz) M Vag-Spont EPI N BURT      Name: JANENE NAVAS      Apgar1: 9  Apgar5: 9   2 Term 14 38w0d  3.402 kg (7 lb 8 oz) M Vag-Spont EPI N BURT      Name: ashlie   1 Term 11 37w0d  3.147 kg (6 lb 15 oz) M Vag-Spont EPI N BURT      Name: davin       Objective:       /69   Pulse 90   Temp 98.3 °F (36.8 °C) (Oral)   Resp 18   SpO2 100%     Vitals:    20 1248 20 1252 20 1253 20 1256   BP:       BP Location:       Patient Position:       Pulse: 86 85 87 90   Resp:       Temp:       TempSrc:       SpO2: 100%  100%        General:   alert, appears stated age and cooperative, no apparent distress   HENT:  normocephalic, atraumatic   Eyes:  extraocular movements and conjunctivae normal   Neck:  supple, range of motion normal, no thyromegaly   Lungs:   no respiratory distress   Heart:   regular rate   Abdomen:  soft, non-tender, non-distended but gravid, no rebound or guarding    Extremities negative edema, negative erythema   FHT: 140, moderate BTBV, +accels, -decels;  Cat 1 (reassuring)                 TOCO: n/a   Presentations: Cephalic by abdominal exam   Cervix: visually closed   Sterile Speculum Exam: attempted by Dr. Oakes in clinic, patient did not tolerate. Attempted again in the [facility] via OBGYN resident and second pass by OBGYN ED staff, patient did not tolerate. Speculum inserted and upon opening patient asked to stop the exam. The sutures were visualized but the knot was not seen.    Lab Review  Blood Type B POS  GBBS: unk  Rubella: Immune  RPR: NR  HIV: negative  HepB: negative       Assessment:        at 36w2d weeks gestation with cerclage in place for incompetent cervix is being admitted for cerlcage removal.     Active Hospital Problems    Diagnosis  POA    Cervical cerclage suture present [O34.30]  Yes      Resolved Hospital Problems   No  resolved problems to display.          Plan:   Cerclage removal.    Risks, benefits, alternatives and possible complications have been discussed in detail with the patient.   - Consents signed and to chart for cerclage removal, labor and delivery for vaginal delivery, operative vag delivery and , and blood transfusion  - Admit to Labor and Delivery Observation until   - Pain management per Anesthesia  - Draw CBC, T&S  -Pt last ate yesterday    Post-Partum Hemorrhage risk - low    Radha Loera MD  OBGYN PGY-1

## 2020-04-30 NOTE — PROCEDURES
Procedure Note:    Pre-op Dx:   IUP 36 w 2 d    Braden cerclage in place  Post-op Dx:  Same plus s/p Braden cerclage removal    Surgeon: Sonu Alan    Assistant: Radha Loera    EBL: < 5ml    Anes: IV sedation    Procedure:   Multiple attempts to remove cerclage in clinic and OB ED--patient intolerant of attempts at removal. Taken to OR and given IV sedation. Positioned in J-stirrups. Speculum placed in vagina Suture visualized and grasped with ring forceps and elevated. Suture scissors used to cut suture. Cerclage removed in its entirety. Cervix hemostatic. Taken to recovery in stable condition.    Sonu Alan Jr., MD  Maternal Fetal Medicine

## 2020-04-30 NOTE — DISCHARGE INSTRUCTIONS
Call clinic [phone] or L & D after hours at [phone] for vaginal bleeding, leakage of fluids, regular contractions every 5 mins for 2 hours, decreased fetal movements ( 10 kicks in 2 hours), headache not relieved by Tylenol, blurry vision, or temp of 100.4 or greater.  Begin doing fetal kick counts, at least 10 movements in 2 hours starting at 28 weeks gestation.  Keep next clinic appointment

## 2020-04-30 NOTE — TRANSFER OF CARE
Anesthesia Transfer of Care Note    Patient: Juanita Lipscomb    Procedure(s) Performed: Procedure(s) (LRB):  CERCLAGE, CERVIX (N/A)    Patient location: Labor and Delivery    Anesthesia Type: MAC    Transport from OR: Transported from OR on room air with adequate spontaneous ventilation    Post pain: adequate analgesia    Post assessment: no apparent anesthetic complications and tolerated procedure well    Post vital signs: stable    Level of consciousness: awake, alert and oriented    Nausea/Vomiting: no nausea/vomiting    Complications: none    Transfer of care protocol was followed      Last vitals:   Visit Vitals  /69   Pulse 90   Temp 36.8 °C (98.3 °F) (Oral)   Resp 18   SpO2 100%

## 2020-04-30 NOTE — DISCHARGE SUMMARY
[facility]  Brief Op Note/Discharge Note  Short Stay    Admit Date: 4/30/2020    Discharge Date: 04/30/2020    Attending Physician: Sonu Alan MD     Surgery Date: 4/30/2020     Surgeon(s) and Role:     * Sonu Alan MD - Primary    Assisting Surgeon: Radha Loera MD    Pre-op Diagnosis:  Cervical cerclage suture present, antepartum [O34.30]    Post-op Diagnosis:  Post-Op Diagnosis Codes:     * Cervical cerclage suture present, antepartum [O34.30]    Procedure(s) (LRB):  CERCLAGE, CERVIX (N/A)    Anesthesia: Choice    Findings/Key Components: Cerclage removed under MAC anesthesia without difficulty.    Estimated Blood Loss: 0 cc         Specimens:   Specimen (12h ago, onward)    None          Discharge Provider: Margarita Wu    Diagnoses:  Active Hospital Problems    Diagnosis  POA    *S/p cerclage removal 4/30/20 [Z98.890]  Not Applicable      Resolved Hospital Problems    Diagnosis Date Resolved POA    Cervical cerclage suture present [O34.30] 04/30/2020 Yes       Discharged Condition: good    Hospital Course:   Patient was admitted for outpatient procedure as above, and tolerated the procedure well with no complications. Please see operative report for further details. Following the procedure, the patient was awakened from anesthesia and transferred to the recovery area in stable condition. She was discharged to home once ambulating, voiding, tolerating PO intake, and pain was well-controlled. Patient was given routine post-op instructions and prescriptions for pain medication to take as needed. Patient instructed to follow up with Dr. Oakes in 1 week.    Final Diagnoses: Same as principal problem.    Disposition: Home or Self Care    Follow up/Patient Instructions:    Medications:  Reconciled Home Medications:      Medication List      CONTINUE taking these medications    famotidine 40 MG tablet  Commonly known as:  PEPCID  Take 1 tablet (40 mg total) by mouth every evening.      fluticasone propionate 50 mcg/actuation nasal spray  Commonly known as:  FLONASE  U 1 SPRAY IEN ONCE D FOR ALLERGIES     multivit41-iron-folate8-ps-dha 1.5 mg iron- 8.73 mg-6.4 mg Cpid  Commonly known as:  ENBRACE HR  Take 1 tablet by mouth once daily.     ondansetron 8 MG Tbdl  Commonly known as:  ZOFRAN-ODT  Take 1 tablet (8 mg total) by mouth every 6 (six) hours as needed.     PNV 29-1 ORAL  Take 1 tablet by mouth once daily.     promethazine 25 MG tablet  Commonly known as:  PHENERGAN  Take 1 tablet (25 mg total) by mouth every 6 (six) hours as needed for Nausea.          Discharge Procedure Orders   Diet Adult Regular     Pelvic Rest   Order Comments: Pelvic rest until follow up visit. Nothing in vagina -no sex, tampons, douching, etc.     Notify your health care provider if you experience any of the following:   Order Comments: Heavy vaginal bleeding saturating more than 1 pad per hr for at least consecutive 2 hrs.     Notify your health care provider if you experience any of the following:  severe uncontrolled pain     Notify your health care provider if you experience any of the following:  persistent nausea and vomiting or diarrhea     Notify your health care provider if you experience any of the following:  temperature >100.4     Follow-up Information     [facility]. Go on 5/6/2020.    Specialty:  Obstetrics and Gynecology  Why:  Prenatal visit  Contact information:  [address] [place] [address] [phone]               Margarita Wu MD  OBGYN, PGY-2

## 2020-05-01 LAB
BACTERIA UR CULT: NO GROWTH
HIV 1+2 AB+HIV1 P24 AG SERPL QL IA: NEGATIVE
RPR SER QL: NORMAL

## 2020-05-02 LAB — BACTERIA SPEC AEROBE CULT: NORMAL

## 2020-05-04 DIAGNOSIS — O99.013 ANEMIA DURING PREGNANCY IN THIRD TRIMESTER: Primary | ICD-10-CM

## 2020-05-04 PROBLEM — D69.6 BENIGN GESTATIONAL THROMBOCYTOPENIA, ANTEPARTUM: Status: ACTIVE | Noted: 2020-05-04

## 2020-05-04 PROBLEM — O99.019 ANTEPARTUM ANEMIA: Status: ACTIVE | Noted: 2020-05-04

## 2020-05-04 PROBLEM — O99.119 BENIGN GESTATIONAL THROMBOCYTOPENIA, ANTEPARTUM: Status: ACTIVE | Noted: 2020-05-04

## 2020-05-04 RX ORDER — FERROUS SULFATE 325(65) MG
325 TABLET ORAL DAILY
Qty: 30 TABLET | Refills: 3 | Status: SHIPPED | OUTPATIENT
Start: 2020-05-04 | End: 2020-05-12 | Stop reason: SDUPTHER

## 2020-05-06 ENCOUNTER — OFFICE VISIT (OUTPATIENT)
Dept: OBSTETRICS AND GYNECOLOGY | Facility: CLINIC | Age: 25
End: 2020-05-06
Payer: MEDICAID

## 2020-05-06 DIAGNOSIS — O34.33 CERVICAL CERCLAGE SUTURE PRESENT IN THIRD TRIMESTER: ICD-10-CM

## 2020-05-06 DIAGNOSIS — O26.893 PREGNANCY HEADACHE IN THIRD TRIMESTER: ICD-10-CM

## 2020-05-06 DIAGNOSIS — N89.8 VAGINAL IRRITATION: ICD-10-CM

## 2020-05-06 DIAGNOSIS — O09.299 PREGNANCY WITH POOR REPRODUCTIVE HISTORY, ANTEPARTUM: Primary | ICD-10-CM

## 2020-05-06 DIAGNOSIS — R51.9 PREGNANCY HEADACHE IN THIRD TRIMESTER: ICD-10-CM

## 2020-05-06 DIAGNOSIS — O99.013 ANEMIA DURING PREGNANCY IN THIRD TRIMESTER: ICD-10-CM

## 2020-05-06 PROCEDURE — 99213 PR OFFICE/OUTPT VISIT, EST, LEVL III, 20-29 MIN: ICD-10-PCS | Mod: TH,95,, | Performed by: OBSTETRICS & GYNECOLOGY

## 2020-05-06 PROCEDURE — 99213 OFFICE O/P EST LOW 20 MIN: CPT | Mod: TH,95,, | Performed by: OBSTETRICS & GYNECOLOGY

## 2020-05-06 NOTE — Clinical Note
Please schedule OB appt at [facility] tomorrow, 5/7/2020, at 8:45 with me for BP check and vaginal irritation.  Thanks.

## 2020-05-06 NOTE — PROGRESS NOTES
The patient location is: HOME  The chief complaint leading to consultation is: ROUTINE PRENATAL  Visit type: Virtual visit with synchronous audio and video  Total time spent with patient: 10 MINUTES  Each patient to whom he or she provides medical services by telemedicine is:  (1) informed of the relationship between the physician and patient and the respective role of any other health care provider with respect to management of the patient; and (2) notified that he or she may decline to receive medical services by telemedicine and may withdraw from such care at any time.    Chief Complaint   Patient presents with    Routine Prenatal Visit       25 y.o., at 37w1d by Estimated Date of Delivery: 20    HPI: Headaches for at least the past week.  Also notes some vaginal irritation and burning.  No discharge.    ROS  OBSTETRICS:   Contractions Y   Bleeding N   Loss of fluid N   Fetal mvmnt Y  GASTRO:   Nausea N   Vomiting N      OB History    Para Term  AB Living   4 3 3 0 0 3   SAB TAB Ectopic Multiple Live Births   0 0 0 0 3      # Outcome Date GA Lbr Tom/2nd Weight Sex Delivery Anes PTL Lv   4 Current            3 Term 17 38w1d / 00:13 2.835 kg (6 lb 4 oz) M Vag-Spont EPI N BURT   2 Term 14 38w0d  3.402 kg (7 lb 8 oz) M Vag-Spont EPI N BURT   1 Term 11 37w0d  3.147 kg (6 lb 15 oz) M Vag-Spont EPI N BURT       Dating reviewed  Allergies and problem list reviewed and updated  Medical and surgical history reviewed  Prenatal labs reviewed and updated    PHYSICAL EXAM  No vitals.  Virtual visit.    GENERAL: No acute distress  HEENT: Normocephalic  NEURO: Alert and oriented x3  PSYCH: Normal mood and affect  PULMONARY: Non-labored respiration; no increased work of breathing      ASSESSMENT AND PLAN    pregnancy Problems (from 17 to present)     Problem Noted Resolved    Pregnancy with poor reproductive history, no flu/TDAP/bot/patch 2019 by Joaquin Andrew MD No     Priority:  1 - High      Overview Signed 3/24/2020  4:27 PM by SILVIA Oakes MD     History of incompetent cervix requiring cerclage in prior pregnancy.         Cervical cerclage suture present, antepartum 12/4/2019 by La Galdamez MD No    Priority:  2       Sickle cell trait 10/14/2019 by Vanessa Davenport MD No    Priority:  3       Antepartum anemia 5/4/2020 by SILVIA Oakes MD No    Benign gestational thrombocytopenia, antepartum 5/4/2020 by SILVIA Oakes MD No          37 weeks / Poor OB history  - Contractions for the past 2 days, q5-min.  Strict labor precautions given.  - Labs up to date  Cerclage - Removed last week in OR  Headaches  - On and off for the past week  - Per pt, normal BP check on Saturday  - Strict BP precautions given  - BP check in clinic tomorrow  Anemia - No lightheadedness.  Continue FeSO4.  Vaginal irritation - Exam in clinic tomorrow      Labor and BP precautions given  Follow-up: 1 day

## 2020-05-07 ENCOUNTER — ANESTHESIA (OUTPATIENT)
Dept: OBSTETRICS AND GYNECOLOGY | Facility: OTHER | Age: 25
End: 2020-05-07
Payer: MEDICAID

## 2020-05-07 ENCOUNTER — ROUTINE PRENATAL (OUTPATIENT)
Dept: OBSTETRICS AND GYNECOLOGY | Facility: CLINIC | Age: 25
End: 2020-05-07
Payer: MEDICAID

## 2020-05-07 ENCOUNTER — ANESTHESIA EVENT (OUTPATIENT)
Dept: OBSTETRICS AND GYNECOLOGY | Facility: OTHER | Age: 25
End: 2020-05-07
Payer: MEDICAID

## 2020-05-07 ENCOUNTER — HOSPITAL ENCOUNTER (INPATIENT)
Facility: OTHER | Age: 25
LOS: 3 days | Discharge: HOME | End: 2020-05-10
Attending: OBSTETRICS & GYNECOLOGY | Admitting: OBSTETRICS & GYNECOLOGY
Payer: MEDICAID

## 2020-05-07 VITALS
SYSTOLIC BLOOD PRESSURE: 120 MMHG | WEIGHT: 179.25 LBS | DIASTOLIC BLOOD PRESSURE: 82 MMHG | BODY MASS INDEX: 30.77 KG/M2

## 2020-05-07 DIAGNOSIS — O26.893 PREGNANCY HEADACHE IN THIRD TRIMESTER: ICD-10-CM

## 2020-05-07 DIAGNOSIS — N89.8 VAGINAL IRRITATION: ICD-10-CM

## 2020-05-07 DIAGNOSIS — Z3A.37 37 WEEKS GESTATION OF PREGNANCY: ICD-10-CM

## 2020-05-07 DIAGNOSIS — O09.293 PREGNANCY WITH POOR REPRODUCTIVE HISTORY IN THIRD TRIMESTER: Primary | ICD-10-CM

## 2020-05-07 DIAGNOSIS — R51.9 PREGNANCY HEADACHE IN THIRD TRIMESTER: ICD-10-CM

## 2020-05-07 DIAGNOSIS — R07.9 CHEST PAIN: ICD-10-CM

## 2020-05-07 DIAGNOSIS — O14.13 PRE-ECLAMPSIA, SEVERE, ANTEPARTUM, THIRD TRIMESTER: ICD-10-CM

## 2020-05-07 LAB
ALBUMIN SERPL BCP-MCNC: 2.9 G/DL (ref 3.5–5.2)
ALP SERPL-CCNC: 88 U/L (ref 55–135)
ALT SERPL W/O P-5'-P-CCNC: 9 U/L (ref 10–44)
ANION GAP SERPL CALC-SCNC: 11 MMOL/L (ref 8–16)
AST SERPL-CCNC: 13 U/L (ref 10–40)
BASOPHILS # BLD AUTO: 0.02 K/UL (ref 0–0.2)
BASOPHILS NFR BLD: 0.2 % (ref 0–1.9)
BILIRUB SERPL-MCNC: 0.3 MG/DL (ref 0.1–1)
BUN SERPL-MCNC: 3 MG/DL (ref 6–20)
CALCIUM SERPL-MCNC: 8.8 MG/DL (ref 8.7–10.5)
CHLORIDE SERPL-SCNC: 106 MMOL/L (ref 95–110)
CO2 SERPL-SCNC: 21 MMOL/L (ref 23–29)
CREAT SERPL-MCNC: 0.7 MG/DL (ref 0.5–1.4)
CREAT UR-MCNC: 102.6 MG/DL (ref 15–325)
DIFFERENTIAL METHOD: ABNORMAL
EOSINOPHIL # BLD AUTO: 0.1 K/UL (ref 0–0.5)
EOSINOPHIL NFR BLD: 1.1 % (ref 0–8)
ERYTHROCYTE [DISTWIDTH] IN BLOOD BY AUTOMATED COUNT: 13.5 % (ref 11.5–14.5)
EST. GFR  (AFRICAN AMERICAN): >60 ML/MIN/1.73 M^2
EST. GFR  (NON AFRICAN AMERICAN): >60 ML/MIN/1.73 M^2
GLUCOSE SERPL-MCNC: 120 MG/DL (ref 70–110)
HCT VFR BLD AUTO: 27.8 % (ref 37–48.5)
HGB BLD-MCNC: 8.8 G/DL (ref 12–16)
IMM GRANULOCYTES # BLD AUTO: 0.28 K/UL (ref 0–0.04)
IMM GRANULOCYTES NFR BLD AUTO: 3.1 % (ref 0–0.5)
LYMPHOCYTES # BLD AUTO: 1.8 K/UL (ref 1–4.8)
LYMPHOCYTES NFR BLD: 20 % (ref 18–48)
MCH RBC QN AUTO: 26 PG (ref 27–31)
MCHC RBC AUTO-ENTMCNC: 31.7 G/DL (ref 32–36)
MCV RBC AUTO: 82 FL (ref 82–98)
MONOCYTES # BLD AUTO: 0.4 K/UL (ref 0.3–1)
MONOCYTES NFR BLD: 4.6 % (ref 4–15)
NEUTROPHILS # BLD AUTO: 6.4 K/UL (ref 1.8–7.7)
NEUTROPHILS NFR BLD: 71 % (ref 38–73)
NRBC BLD-RTO: 0 /100 WBC
PLATELET # BLD AUTO: 153 K/UL (ref 150–350)
PMV BLD AUTO: 13.6 FL (ref 9.2–12.9)
POTASSIUM SERPL-SCNC: 3.6 MMOL/L (ref 3.5–5.1)
PROT SERPL-MCNC: 7 G/DL (ref 6–8.4)
PROT UR-MCNC: 12 MG/DL (ref 0–15)
PROT/CREAT UR: 0.12 MG/G{CREAT} (ref 0–0.2)
RBC # BLD AUTO: 3.38 M/UL (ref 4–5.4)
SARS-COV-2 RDRP RESP QL NAA+PROBE: NEGATIVE
SODIUM SERPL-SCNC: 138 MMOL/L (ref 136–145)
WBC # BLD AUTO: 8.99 K/UL (ref 3.9–12.7)

## 2020-05-07 PROCEDURE — 72100003 HC LABOR CARE, EA. ADDL. 8 HRS

## 2020-05-07 PROCEDURE — 63600175 PHARM REV CODE 636 W HCPCS: Performed by: STUDENT IN AN ORGANIZED HEALTH CARE EDUCATION/TRAINING PROGRAM

## 2020-05-07 PROCEDURE — 85025 COMPLETE CBC W/AUTO DIFF WBC: CPT

## 2020-05-07 PROCEDURE — 80053 COMPREHEN METABOLIC PANEL: CPT

## 2020-05-07 PROCEDURE — 59409 OBSTETRICAL CARE: CPT | Mod: AA,,, | Performed by: ANESTHESIOLOGY

## 2020-05-07 PROCEDURE — 51702 INSERT TEMP BLADDER CATH: CPT

## 2020-05-07 PROCEDURE — 25000003 PHARM REV CODE 250: Performed by: STUDENT IN AN ORGANIZED HEALTH CARE EDUCATION/TRAINING PROGRAM

## 2020-05-07 PROCEDURE — 99999 PR PBB SHADOW E&M-EST. PATIENT-LVL II: CPT | Mod: PBBFAC,,, | Performed by: OBSTETRICS & GYNECOLOGY

## 2020-05-07 PROCEDURE — 87481 CANDIDA DNA AMP PROBE: CPT | Mod: 59

## 2020-05-07 PROCEDURE — 99214 PR OFFICE/OUTPT VISIT, EST, LEVL IV, 30-39 MIN: ICD-10-PCS | Mod: TH,S$PBB,, | Performed by: OBSTETRICS & GYNECOLOGY

## 2020-05-07 PROCEDURE — 99214 OFFICE O/P EST MOD 30 MIN: CPT | Mod: TH,S$PBB,, | Performed by: OBSTETRICS & GYNECOLOGY

## 2020-05-07 PROCEDURE — 99999 PR PBB SHADOW E&M-EST. PATIENT-LVL II: ICD-10-PCS | Mod: PBBFAC,,, | Performed by: OBSTETRICS & GYNECOLOGY

## 2020-05-07 PROCEDURE — 99212 OFFICE O/P EST SF 10 MIN: CPT | Mod: PBBFAC,TH,PO | Performed by: OBSTETRICS & GYNECOLOGY

## 2020-05-07 PROCEDURE — 11000001 HC ACUTE MED/SURG PRIVATE ROOM

## 2020-05-07 PROCEDURE — 59025 FETAL NON-STRESS TEST: CPT

## 2020-05-07 PROCEDURE — 72100002 HC LABOR CARE, 1ST 8 HOURS

## 2020-05-07 PROCEDURE — 87801 DETECT AGNT MULT DNA AMPLI: CPT

## 2020-05-07 PROCEDURE — 59409 PRA ETRICAL CARE,VAG DELIV ONLY: ICD-10-PCS | Mod: AA,,, | Performed by: ANESTHESIOLOGY

## 2020-05-07 PROCEDURE — 62326 NJX INTERLAMINAR LMBR/SAC: CPT | Performed by: STUDENT IN AN ORGANIZED HEALTH CARE EDUCATION/TRAINING PROGRAM

## 2020-05-07 PROCEDURE — 84156 ASSAY OF PROTEIN URINE: CPT

## 2020-05-07 PROCEDURE — U0002 COVID-19 LAB TEST NON-CDC: HCPCS

## 2020-05-07 PROCEDURE — 99285 EMERGENCY DEPT VISIT HI MDM: CPT | Mod: 25,27

## 2020-05-07 RX ORDER — CEFAZOLIN SODIUM 2 G/50ML
2 SOLUTION INTRAVENOUS ONCE AS NEEDED
Status: DISCONTINUED | OUTPATIENT
Start: 2020-05-07 | End: 2020-05-08

## 2020-05-07 RX ORDER — MAGNESIUM SULFATE HEPTAHYDRATE 40 MG/ML
2 INJECTION, SOLUTION INTRAVENOUS CONTINUOUS
Status: DISCONTINUED | OUTPATIENT
Start: 2020-05-07 | End: 2020-05-10 | Stop reason: HOSPADM

## 2020-05-07 RX ORDER — CALCIUM GLUCONATE 98 MG/ML
1 INJECTION, SOLUTION INTRAVENOUS
Status: DISCONTINUED | OUTPATIENT
Start: 2020-05-07 | End: 2020-05-08

## 2020-05-07 RX ORDER — SODIUM CHLORIDE 9 MG/ML
INJECTION, SOLUTION INTRAVENOUS CONTINUOUS
Status: DISCONTINUED | OUTPATIENT
Start: 2020-05-07 | End: 2020-05-08

## 2020-05-07 RX ORDER — CALCIUM CARBONATE 200(500)MG
500 TABLET,CHEWABLE ORAL 3 TIMES DAILY PRN
Status: DISCONTINUED | OUTPATIENT
Start: 2020-05-07 | End: 2020-05-08

## 2020-05-07 RX ORDER — OXYTOCIN/RINGER'S LACTATE 30/500 ML
2 PLASTIC BAG, INJECTION (ML) INTRAVENOUS CONTINUOUS
Status: DISCONTINUED | OUTPATIENT
Start: 2020-05-07 | End: 2020-05-08

## 2020-05-07 RX ORDER — ONDANSETRON 8 MG/1
8 TABLET, ORALLY DISINTEGRATING ORAL EVERY 8 HOURS PRN
Status: DISCONTINUED | OUTPATIENT
Start: 2020-05-07 | End: 2020-05-08

## 2020-05-07 RX ORDER — LIDOCAINE HYDROCHLORIDE AND EPINEPHRINE 15; 5 MG/ML; UG/ML
INJECTION, SOLUTION EPIDURAL
Status: DISCONTINUED | OUTPATIENT
Start: 2020-05-07 | End: 2020-05-08

## 2020-05-07 RX ORDER — FENTANYL/BUPIVACAINE/NS/PF 2MCG/ML-.1
PLASTIC BAG, INJECTION (ML) INJECTION CONTINUOUS
Status: DISCONTINUED | OUTPATIENT
Start: 2020-05-07 | End: 2020-05-08

## 2020-05-07 RX ORDER — OXYTOCIN/RINGER'S LACTATE 30/500 ML
95 PLASTIC BAG, INJECTION (ML) INTRAVENOUS ONCE
Status: COMPLETED | OUTPATIENT
Start: 2020-05-07 | End: 2020-05-08

## 2020-05-07 RX ORDER — MAGNESIUM SULFATE HEPTAHYDRATE 40 MG/ML
4 INJECTION, SOLUTION INTRAVENOUS ONCE
Status: COMPLETED | OUTPATIENT
Start: 2020-05-07 | End: 2020-05-07

## 2020-05-07 RX ORDER — LABETALOL HYDROCHLORIDE 5 MG/ML
80 INJECTION, SOLUTION INTRAVENOUS ONCE AS NEEDED
Status: DISCONTINUED | OUTPATIENT
Start: 2020-05-07 | End: 2020-05-08

## 2020-05-07 RX ORDER — PROCHLORPERAZINE MALEATE 5 MG
10 TABLET ORAL ONCE
Status: COMPLETED | OUTPATIENT
Start: 2020-05-07 | End: 2020-05-07

## 2020-05-07 RX ORDER — MISOPROSTOL 100 MCG
50 TABLET ORAL
Status: DISCONTINUED | OUTPATIENT
Start: 2020-05-07 | End: 2020-05-08

## 2020-05-07 RX ORDER — FENTANYL CITRATE 50 UG/ML
INJECTION, SOLUTION INTRAMUSCULAR; INTRAVENOUS
Status: DISCONTINUED | OUTPATIENT
Start: 2020-05-07 | End: 2020-05-08

## 2020-05-07 RX ORDER — SIMETHICONE 80 MG
1 TABLET,CHEWABLE ORAL 4 TIMES DAILY PRN
Status: DISCONTINUED | OUTPATIENT
Start: 2020-05-07 | End: 2020-05-08

## 2020-05-07 RX ORDER — LABETALOL HYDROCHLORIDE 5 MG/ML
20 INJECTION, SOLUTION INTRAVENOUS ONCE AS NEEDED
Status: DISCONTINUED | OUTPATIENT
Start: 2020-05-07 | End: 2020-05-08

## 2020-05-07 RX ORDER — FENTANYL/BUPIVACAINE/NS/PF 2MCG/ML-.1
PLASTIC BAG, INJECTION (ML) INJECTION
Status: DISPENSED
Start: 2020-05-07 | End: 2020-05-08

## 2020-05-07 RX ORDER — BUTALBITAL, ACETAMINOPHEN AND CAFFEINE 50; 325; 40 MG/1; MG/1; MG/1
2 TABLET ORAL
Status: COMPLETED | OUTPATIENT
Start: 2020-05-07 | End: 2020-05-07

## 2020-05-07 RX ORDER — SODIUM CHLORIDE 9 MG/ML
INJECTION, SOLUTION INTRAVENOUS
Status: DISCONTINUED | OUTPATIENT
Start: 2020-05-07 | End: 2020-05-08

## 2020-05-07 RX ORDER — HYDRALAZINE HYDROCHLORIDE 20 MG/ML
10 INJECTION INTRAMUSCULAR; INTRAVENOUS ONCE AS NEEDED
Status: DISCONTINUED | OUTPATIENT
Start: 2020-05-07 | End: 2020-05-08

## 2020-05-07 RX ORDER — LABETALOL HYDROCHLORIDE 5 MG/ML
40 INJECTION, SOLUTION INTRAVENOUS ONCE AS NEEDED
Status: DISCONTINUED | OUTPATIENT
Start: 2020-05-07 | End: 2020-05-08

## 2020-05-07 RX ORDER — SODIUM CHLORIDE, SODIUM LACTATE, POTASSIUM CHLORIDE, CALCIUM CHLORIDE 600; 310; 30; 20 MG/100ML; MG/100ML; MG/100ML; MG/100ML
INJECTION, SOLUTION INTRAVENOUS CONTINUOUS
Status: DISCONTINUED | OUTPATIENT
Start: 2020-05-07 | End: 2020-05-08

## 2020-05-07 RX ORDER — FENTANYL CITRATE 50 UG/ML
INJECTION, SOLUTION INTRAMUSCULAR; INTRAVENOUS
Status: COMPLETED
Start: 2020-05-07 | End: 2020-05-07

## 2020-05-07 RX ORDER — BUPIVACAINE HYDROCHLORIDE 2.5 MG/ML
INJECTION, SOLUTION EPIDURAL; INFILTRATION; INTRACAUDAL
Status: DISPENSED
Start: 2020-05-07 | End: 2020-05-08

## 2020-05-07 RX ORDER — OXYTOCIN/RINGER'S LACTATE 30/500 ML
334 PLASTIC BAG, INJECTION (ML) INTRAVENOUS ONCE
Status: COMPLETED | OUTPATIENT
Start: 2020-05-07 | End: 2020-05-08

## 2020-05-07 RX ADMIN — ONDANSETRON 8 MG: 8 TABLET, ORALLY DISINTEGRATING ORAL at 08:05

## 2020-05-07 RX ADMIN — MAGNESIUM SULFATE HEPTAHYDRATE 4 G: 40 INJECTION, SOLUTION INTRAVENOUS at 01:05

## 2020-05-07 RX ADMIN — PROCHLORPERAZINE MALEATE 10 MG: 5 TABLET ORAL at 06:05

## 2020-05-07 RX ADMIN — MISOPROSTOL 50 MCG: 100 TABLET ORAL at 03:05

## 2020-05-07 RX ADMIN — LIDOCAINE HYDROCHLORIDE,EPINEPHRINE BITARTRATE 3 ML: 15; .005 INJECTION, SOLUTION EPIDURAL; INFILTRATION; INTRACAUDAL; PERINEURAL at 04:05

## 2020-05-07 RX ADMIN — FENTANYL CITRATE 100 MCG: 50 INJECTION, SOLUTION INTRAMUSCULAR; INTRAVENOUS at 04:05

## 2020-05-07 RX ADMIN — Medication 5 ML: at 04:05

## 2020-05-07 RX ADMIN — SODIUM CHLORIDE, SODIUM LACTATE, POTASSIUM CHLORIDE, AND CALCIUM CHLORIDE: .6; .31; .03; .02 INJECTION, SOLUTION INTRAVENOUS at 03:05

## 2020-05-07 RX ADMIN — PROCHLORPERAZINE MALEATE 10 MG: 5 TABLET ORAL at 12:05

## 2020-05-07 RX ADMIN — BUTALBITAL, ACETAMINOPHEN AND CAFFEINE 2 TABLET: 50; 325; 40 TABLET ORAL at 11:05

## 2020-05-07 RX ADMIN — Medication 2 MILLI-UNITS/MIN: at 08:05

## 2020-05-07 NOTE — ED PROVIDER NOTES
Encounter Date: 2020       History     Chief Complaint   Patient presents with    Headache     Juanita Lipscomb is a 25 y.o. [id number] at 37w2d presents from clinic complaining of headache and vision changes. Patient reports headaches on and off for the past week. Reports that her current HA started last night and is unrelieved with Tylenol.  She reports associated blurry vision; pt had to have someone drive her to clinic today. Also endorses RUQ pain. She reports periods of SOB with ambulation but not at rest. Denies any chest pain.    This IUP is complicated by sickle cell trait and history of incompetent cervix (s/p Braden cerclage removal).  Patient denies contractions, denies vaginal bleeding, denies LOF. Fetal Movement: normal.        Review of patient's allergies indicates:   Allergen Reactions    Watermelon Shortness Of Breath, Itching, Swelling and Edema     Chest pain, angioedema, SOB, after eating watermelon tonight     Past Medical History:   Diagnosis Date    Antepartum anemia 2020    Incompetent cervix     Pre-eclampsia, severe, antepartum, third trimester 2020    Rh incompatibility     Sickle cell trait     Thyroid disease     Hypothyroidism     Past Surgical History:   Procedure Laterality Date    CERVICAL CERCLAGE      CERVICAL CERCLAGE N/A 2019    Procedure: CERCLAGE, CERVIX;  Surgeon: Atul Mast MD;  Location: [facility] L&D;  Service: OB/GYN;  Laterality: N/A;  alejandro would like 10am    CERVICAL CERCLAGE N/A 2020    Procedure: CERCLAGE, CERVIX;  Surgeon: Sonu Alan MD;  Location: [facility]&D;  Service: OB/GYN;  Laterality: N/A;     Family History   Problem Relation Age of Onset    Hypertension Mother     Breast cancer Neg Hx     Eclampsia Neg Hx      Social History     Tobacco Use    Smoking status: Never Smoker    Smokeless tobacco: Never Used   Substance Use Topics    Alcohol use: No    Drug use: No     Review of Systems   Constitutional: Negative  for chills, fatigue and fever.   HENT: Negative for congestion.    Eyes: Positive for visual disturbance.   Respiratory: Negative for cough.    Cardiovascular: Negative for chest pain and leg swelling.   Gastrointestinal: Positive for abdominal pain. Negative for nausea and vomiting.   Genitourinary: Negative for dysuria, pelvic pain, vaginal bleeding and vaginal discharge.   Musculoskeletal: Negative for back pain.   Skin: Negative for rash.   Neurological: Positive for headaches.       Physical Exam     Initial Vitals [05/07/20 1110]   BP Pulse Resp Temp SpO2   117/74 76 18 98.3 °F (36.8 °C) 100 %      MAP       --         Physical Exam    Vitals reviewed.  Constitutional: She appears well-developed and well-nourished. No distress.   HENT:   Head: Normocephalic and atraumatic.   Eyes: EOM are normal.   Neck: Normal range of motion.   Cardiovascular: Normal rate, regular rhythm and normal heart sounds. Exam reveals no gallop and no friction rub.    No murmur heard.  Pulmonary/Chest: Breath sounds normal. No respiratory distress. She has no wheezes. She has no rales.   Abdominal: Soft. There is no tenderness. There is no rebound and no guarding.   Gravid uterus, no TTP noted in RUQ   Musculoskeletal: Normal range of motion. She exhibits no edema.   Neurological: She is alert and oriented to person, place, and time.   Skin: Skin is warm and dry.   Psychiatric: She has a normal mood and affect. Thought content normal.     OB LABOR EXAM:       Method: Sterile vaginal exam per MD.       Dilatation: 1.   Station: -3.   Effacement: 50%.             ED Course   Obtain Fetal nonstress test (NST)  Date/Time: 5/7/2020 12:03 PM  Performed by: Karen Patrick MD  Authorized by: Karen Patrick MD     Nonstress Test:     Variability:  6-25 BPM    Decelerations:  None    Accelerations:  15 bpm    Baseline:  140    Uterine Irritability: Yes      Contractions:  Not present  Biophysical Profile:     Nonstress Test  Interpretation: reactive      Overall Impression:  Reassuring      Labs Reviewed   CBC W/ AUTO DIFFERENTIAL - Abnormal; Notable for the following components:       Result Value    RBC 3.38 (*)     Hemoglobin 8.8 (*)     Hematocrit 27.8 (*)     Mean Corpuscular Hemoglobin 26.0 (*)     Mean Corpuscular Hemoglobin Conc 31.7 (*)     MPV 13.6 (*)     Immature Granulocytes 3.1 (*)     Immature Grans (Abs) 0.28 (*)     All other components within normal limits   COMPREHENSIVE METABOLIC PANEL - Abnormal; Notable for the following components:    CO2 21 (*)     Glucose 120 (*)     BUN, Bld 3 (*)     Albumin 2.9 (*)     ALT 9 (*)     All other components within normal limits   PROTEIN / CREATININE RATIO, URINE   SARS-COV-2 RNA AMPLIFICATION, QUAL          Imaging Results    None          Medical Decision Making:   Initial Assessment:   Juanita Lipscomb is a 25 y.o. [id number] at 37w2d presents from clinic complaining of headache and vision changes.    ED Management:  VSS. All BP in series WNL: 117/75 > 107/64 > 101/59 >103/68 108/67 >104/70. NST reactive and reassuring. PreE labs obtained. Fioricet given for HA.     Patient reports no relief with Fioricet. Will give Compazine. Labs reviewed are WNL: AST/ALT 13/7, Cr: 0.7, platelets: 153, P:C of 0.12.    Patient continues to report HA despite multiple agents (Tylenol, Fioricet, and Compazine). Will admit to L&D for IOL given persistent HA and vision changes with new diagnosis of atypical PreE with SF (persistent neurological symptoms). Will start MgSO4 for seizure prophylaxis. Consents signed and to chart. Fetus cephalic by BSUS. GBS negative. Covid-19 pending.    Other:   I have discussed this case with another health care provider.       <> Summary of the Discussion: Dr. Khan                   ED Course as of May 07 1827   Thu May 07, 2020   1339 HA unresponsive to tylenol, fioricet, compazine. Will admit for IOL due to atypical PreE with SF (neuro sxs) with MgSO4. Would  recommend head imaging if HA doesn't resolve after delivery.    [LB]      ED Course User Index  [LB] Nasrin Khan MD          Clinical Impression:       ICD-10-CM ICD-9-CM   1. Pre-eclampsia, severe, antepartum, third trimester O14.13 642.53   2. 37 weeks gestation of pregnancy Z3A.37 V22.2         ED Disposition Condition    Send to L&D            Karen Patrick M.D.  OB/GYN PGY-1     Karen Patrick MD  Resident  05/07/20 [id number]

## 2020-05-07 NOTE — ANESTHESIA PREPROCEDURE EVALUATION
[facility]  Anesthesia Pre-Operative Evaluation       Patient Name: Juanita Lipscomb  YOB: 1995  MRN: [id number]    SUBJECTIVE:     Juanita Lipscomb is a 25 y.o. female [id number] at 37w2d presents from clinic complaining of headache and vision changes. In the [facility], patient was given compazine and fioricet with no relief of her headache. Admitted to L&D for induction 2/2 pre eclampsia with severe features.     Current pregnancy complicated by PMHx of sickle cell trait and Hx of incompetent cervix (s/p Braden cerclage removal).      Denies previous issues with neuraxial anesthesia.  Denies previous issues with general anesthesia.  Denies family history of issues with anesthesia.    Denies hx of seizures, strokes, HTN, heart failure, smoking, asthma, COPD, acid reflux, liver disease, kidney disease, bleeding disorders, taking blood thinners, back surgery.    Lab Results   Component Value Date     2020       OB History    Para Term  AB Living   4 3 3 0 0 3   SAB TAB Ectopic Multiple Live Births   0 0 0 0 3      # Outcome Date GA Lbr Tom/2nd Weight Sex Delivery Anes PTL Lv   4 Current            3 Term 17 38w1d / 00:13 2.835 kg (6 lb 4 oz) M Vag-Spont EPI N BURT   2 Term 14 38w0d  3.402 kg (7 lb 8 oz) M Vag-Spont EPI N BURT   1 Term 11 37w0d  3.147 kg (6 lb 15 oz) M Vag-Spont EPI N BURT       Past Surgical History:   Procedure Laterality Date    CERVICAL CERCLAGE      CERVICAL CERCLAGE N/A 2019    Procedure: CERCLAGE, CERVIX;  Surgeon: Atul Mast MD;  Location: [facility] L&D;  Service: OB/GYN;  Laterality: N/A;  alejandro would like 10am    CERVICAL CERCLAGE N/A 2020    Procedure: CERCLAGE, CERVIX;  Surgeon: Sonu Alan MD;  [facility]: [facility];  Service: OB/GYN;  Laterality: N/A;        Patient Active Problem List   Diagnosis    Sickle cell trait    Pregnancy with poor reproductive history, no flu/TDAP/bot/patch    Cervical cerclage suture  present, antepartum    S/p cerclage removal 4/30/20    Antepartum anemia    Benign gestational thrombocytopenia, antepartum    Pre-eclampsia, severe, antepartum, third trimester       Review of patient's allergies indicates:   Allergen Reactions    Watermelon Shortness Of Breath, Itching, Swelling and Edema     Chest pain, angioedema, SOB, after eating watermelon tonight       Social History     Socioeconomic History    Marital status: Single     Spouse name: Not on file    Number of children: Not on file    Years of education: Not on file    Highest education level: Not on file   Occupational History    Not on file   Social Needs    Financial resource strain: Not on file    Food insecurity:     Worry: Not on file     Inability: Not on file    Transportation needs:     Medical: Not on file     Non-medical: Not on file   Tobacco Use    Smoking status: Never Smoker    Smokeless tobacco: Never Used   Substance and Sexual Activity    Alcohol use: No    Drug use: No    Sexual activity: Not Currently   Lifestyle    Physical activity:     Days per week: Not on file     Minutes per session: Not on file    Stress: Not on file   Relationships    Social connections:     Talks on phone: Not on file     Gets together: Not on file     Attends [employer] service: Not on file     Active member of club or organization: Not on file     Attends meetings of clubs or organizations: Not on file     Relationship status: Not on file   Other Topics Concern    Not on file   Social History Narrative    Not on file       OBJECTIVE:     Weight:  Wt Readings from Last 4 Encounters:   05/07/20 83 kg (183 lb)   05/07/20 81.3 kg (179 lb 3.7 oz)   04/30/20 80.8 kg (178 lb 2.1 oz)   04/02/20 78.5 kg (173 lb 1 oz)     Body mass index is 31.41 kg/m².    Outpatient Medications:  No current facility-administered medications on file prior to encounter.      Current Outpatient Medications on File Prior to Encounter   Medication Sig  Dispense Refill    famotidine (PEPCID) 40 MG tablet Take 1 tablet (40 mg total) by mouth every evening. 30 tablet 2    ferrous sulfate (FEOSOL) 325 mg (65 mg iron) Tab tablet Take 1 tablet (325 mg total) by mouth once daily. 30 tablet 3    fluticasone propionate (FLONASE) 50 mcg/actuation nasal spray U 1 SPRAY IEN ONCE D FOR ALLERGIES      ondansetron (ZOFRAN-ODT) 8 MG TbDL Take 1 tablet (8 mg total) by mouth every 6 (six) hours as needed. 60 tablet 3    PRENATAL VIT #76/IRON,CARB/FA (PNV 29-1 ORAL) Take 1 tablet by mouth once daily.      prenatal92-iron-folate8-ps-dha (ENBRACE HR) 1.5 mg iron- 8.73 mg-6.4 mg CpID Take 1 tablet by mouth once daily. 90 each 2    promethazine (PHENERGAN) 25 MG tablet Take 1 tablet (25 mg total) by mouth every 6 (six) hours as needed for Nausea. 60 tablet 3        Current Inpatient Medications:   miSOPROStoL  50 mcg Oral Q4H    oxytocin in lactated ringers  334 saul-units/min Intravenous Once    oxytocin in lactated ringers  95 saul-units/min Intravenous Once       BP Readings from Last 3 Encounters:   05/07/20 126/74   05/07/20 120/82   04/30/20 110/69         Chemistry        Component Value Date/Time     05/07/2020 1120    K 3.6 05/07/2020 1120     05/07/2020 1120    CO2 21 (L) 05/07/2020 1120    BUN 3 (L) 05/07/2020 1120    CREATININE 0.7 05/07/2020 1120     (H) 05/07/2020 1120        Component Value Date/Time    CALCIUM 8.8 05/07/2020 1120    ALKPHOS 88 05/07/2020 1120    AST 13 05/07/2020 1120    ALT 9 (L) 05/07/2020 1120    BILITOT 0.3 05/07/2020 1120    ESTGFRAFRICA >60 05/07/2020 1120    EGFRNONAA >60 05/07/2020 1120            Lab Results   Component Value Date    WBC 8.99 05/07/2020    HGB 8.8 (L) 05/07/2020    HCT 27.8 (L) 05/07/2020    MCV 82 05/07/2020     05/07/2020       No results for input(s): PT, INR, PROTIME, APTT in the last 72 hours.      Anesthesia Evaluation    I have reviewed the Patient Summary Reports.    I have reviewed  the Nursing Notes.   I have reviewed the Medications.     Review of Systems  Anesthesia Hx:  No problems with previous Anesthesia  History of prior surgery of interest to airway management or planning: Denies Family Hx of Anesthesia complications.   Denies Personal Hx of Anesthesia complications.   Social:  Non-Smoker    Hematology/Oncology:         -- Anemia:   Cardiovascular:  Cardiovascular Normal  Denies Hypertension.     Pulmonary:  Pulmonary Normal  Denies COPD.  Denies Asthma.    Hepatic/GI:  Hepatic/GI Normal    Musculoskeletal:  Musculoskeletal Normal  Denies Spine Disorders    Neurological:  Neurology Normal Denies Seizures.    Endocrine:   Denies Diabetes. Hypothyroidism        Physical Exam  General:  Well nourished    Airway/Jaw/Neck:  Airway Findings: Mouth Opening: Normal Tongue: Normal  General Airway Assessment: Adult  Mallampati: I  TM Distance: Normal, at least 6 cm  Jaw/Neck Findings:  Neck ROM: Normal ROM  Neck Findings: Normal    Eyes/Ears/Nose:  EYES/EARS/NOSE FINDINGS: Normal   Dental:  Dental Findings: In tact   Chest/Lungs:  Chest/Lungs Findings: Normal Respiratory Rate     Heart/Vascular:  Heart Findings: Rate: Normal  Rhythm: Regular Rhythm        Mental Status:  Mental Status Findings: Normal        Anesthesia Plan  Type of Anesthesia, risks & benefits discussed:  Anesthesia Type:  general, epidural, CSE, spinal  Patient's Preference:   Intra-op Monitoring Plan: standard ASA monitors  Intra-op Monitoring Plan Comments:   Post Op Pain Control Plan: multimodal analgesia, IV/PO Opioids PRN and per primary service following discharge from PACU  Post Op Pain Control Plan Comments:   Induction:   IV  Beta Blocker:  Patient is not currently on a Beta-Blocker (No further documentation required).       Informed Consent: Patient understands risks and agrees with Anesthesia plan.  Questions answered. Anesthesia consent signed with patient.  ASA Score: 2     Day of Surgery Review of History &  Physical: I have interviewed and examined the patient. I have reviewed the patient's H&P dated:  There are no significant changes.  H&P update referred to the surgeon.         Ready For Surgery From Anesthesia Perspective.

## 2020-05-07 NOTE — ANESTHESIA PROCEDURE NOTES
Epidural    Patient location during procedure: OB   Reason for block: primary anesthetic   Diagnosis: IUP   Start time: 5/7/2020 5:41 PM  Timeout: 5/7/2020 5:40 PM  End time: 5/7/2020 5:43 PM  Surgery related to: Vaginal Delivery    Staffing  Performing Provider: Peewee Brown MD  Authorizing Provider: Mandi Rivera MD        Preanesthetic Checklist  Completed: patient identified, site marked, surgical consent, pre-op evaluation, timeout performed, IV checked, risks and benefits discussed, monitors and equipment checked, anesthesia consent given, hand hygiene performed and patient being monitored  Preparation  Patient position: sitting  Prep: ChloraPrep  Patient monitoring: Pulse Ox  Epidural  Skin Anesthetic: lidocaine 1%  Skin Wheal: 3 mL  Administration type: continuous  Approach: midline  Interspace: L3-4    Injection technique: KRISTY saline  Needle and Epidural Catheter  Needle type: Tuohy   Needle gauge: 17  Needle length: 3.5 inches  Catheter type: springwound  Catheter size: 19 G  Test dose: 3 mL of lidocaine 1.5% with Epi 1-to-200,000  Additional Documentation: incremental injection, negative aspiration for heme and CSF, no paresthesia on injection, no signs/symptoms of IV or SA injection, no significant pain on injection and no significant complaints from patient  Needle localization: anatomical landmarks  Medications:  Volume per aspiration: 5 mL  Time between aspirations: 5 minutes  Assessment  Ease of block: easy  Patient's tolerance of the procedure: comfortable throughout block and no complaints

## 2020-05-07 NOTE — ED NOTES
Pt presents to [facility] from clinic with c/o headache that started last week.  Pt rates headache pain 10/10, endorses intermittent vision changes and SOB. Reports tylenol did not relieve headache. Endorses +FM. Denies LOF, vaginal bleeding, and ctx. Urine sample obtained. VSS. MD notified of pt arrival.

## 2020-05-07 NOTE — H&P
HISTORY AND PHYSICAL                                                OBSTETRICS          Subjective:      Juanita Lipscomb is a 25 y.o. [id number] at 37w2d presents from clinic complaining of headache and vision changes. Patient reports headaches on and off for the past week. Reports that her current HA started last night and is unrelieved with Tylenol.  She reports associated blurry vision; pt had to have someone drive her to clinic today. Also endorses RUQ pain. She reports periods of SOB with ambulation but not at rest. In the [facility], patient was given compazine and fioricet with no relief of her headache. Due to this reason, decision was made to admit patient to L&D for pre eclampsia with severe features; severe feature being her headache.      This IUP is complicated by sickle cell trait and history of incompetent cervix (s/p Braden cerclage removal).  Patient denies contractions, denies vaginal bleeding, denies LOF. Fetal Movement: normal    Review of Systems   Constitutional: Negative for chills, diaphoresis and fever.   HENT: Negative for congestion, nosebleeds, sinus pain and sore throat.    Eyes: Negative for double vision, photophobia and pain.   Respiratory: Negative for cough, sputum production and shortness of breath.    Cardiovascular: Negative for chest pain, claudication and leg swelling.   Gastrointestinal: Negative for abdominal pain, blood in stool, constipation, diarrhea, nausea and vomiting.   Genitourinary: Negative for dysuria, flank pain, frequency and hematuria.   Musculoskeletal: Negative for back pain, joint pain and myalgias.   Neurological: Positive for headaches. Negative for sensory change, speech change, focal weakness and weakness.   Psychiatric/Behavioral: Negative for depression, hallucinations, substance abuse and suicidal ideas.         PMHx:   Past Medical History:   Diagnosis Date    Antepartum anemia 2020    Incompetent cervix     Pre-eclampsia, severe, antepartum, third  trimester 5/7/2020    Rh incompatibility     Sickle cell trait     Thyroid disease     Hypothyroidism       PSHx:   Past Surgical History:   Procedure Laterality Date    CERVICAL CERCLAGE      CERVICAL CERCLAGE N/A 12/4/2019    Procedure: CERCLAGE, CERVIX;  Surgeon: Atul Mast MD;  Location: [facility];  Service: OB/GYN;  Laterality: N/A;  alejandro would like 10am    CERVICAL CERCLAGE N/A 4/30/2020    Procedure: CERCLAGE, CERVIX;  Surgeon: Sonu Alan MD;  Location: [facility];  Service: OB/GYN;  Laterality: N/A;       All:   Review of patient's allergies indicates:   Allergen Reactions    Watermelon Shortness Of Breath, Itching, Swelling and Edema     Chest pain, angioedema, SOB, after eating watermelon tonight       Meds:   (Not in a hospital admission)    SH:   Social History     Socioeconomic History    Marital status: Single     Spouse name: Not on file    Number of children: Not on file    Years of education: Not on file    Highest education level: Not on file   Occupational History    Not on file   Social Needs    Financial resource strain: Not on file    Food insecurity:     Worry: Not on file     Inability: Not on file    Transportation needs:     Medical: Not on file     Non-medical: Not on file   Tobacco Use    Smoking status: Never Smoker    Smokeless tobacco: Never Used   Substance and Sexual Activity    Alcohol use: No    Drug use: No    Sexual activity: Not Currently   Lifestyle    Physical activity:     Days per week: Not on file     Minutes per session: Not on file    Stress: Not on file   Relationships    Social connections:     Talks on phone: Not on file     Gets together: Not on file     Attends Protestant service: Not on file     Active member of club or organization: Not on file     Attends meetings of clubs or organizations: Not on file     Relationship status: Not on file   Other Topics Concern    Not on file   Social History Narrative    Not on file        FH:   Family History   Problem Relation Age of Onset    Hypertension Mother     Breast cancer Neg Hx     Eclampsia Neg Hx        OBHx:   OB History    Para Term  AB Living   4 3 3 0 0 3   SAB TAB Ectopic Multiple Live Births   0 0 0 0 3      # Outcome Date GA Lbr Tom/2nd Weight Sex Delivery Anes PTL Lv   4 Current            3 Term 17 38w1d / 00:13 2.835 kg (6 lb 4 oz) M Vag-Spont EPI N BURT      Name: JANENE NAVAS      Apgar1: 9  Apgar5: 9   2 Term 14 38w0d  3.402 kg (7 lb 8 oz) M Vag-Spont EPI N BURT      Name: ashlie   1 Term 11 37w0d  3.147 kg (6 lb 15 oz) M Vag-Spont EPI N BURT      Name: davin       Objective:       /70   Pulse 93   Temp 98.3 °F (36.8 °C)   Resp 18   SpO2 100%     Vitals:    20 1200 20 1214 20 1215 20 1230   BP:  108/67  104/70   Pulse: 91 92 93    Resp:       Temp:       SpO2: 100%  100%        General:   alert, appears stated age and cooperative   Lungs:   clear to auscultation bilaterally   Heart:   regular rate and rhythm, S1, S2 normal, no murmur, click, rub or gallop   Abdomen:  soft, non-tender; bowel sounds normal; no masses,  no organomegaly   Extremities negative edema, negative erythema   FHT: 135, +accels, no decels, moderate btbv,  Cat 1 (reassuring)                 TOCO: Irritable   Presentations: cephalic by ultrasound   Cervix:     Dilation: 1cm    Effacement: 50%    Station:  -3    Consistency: firm    Position: posterior     Lab Review  Blood Type B POS  GBBS: negative  Rubella: Immune  RPR: NR  HIV: negative  HepB: negative       Assessment:       37w2d weeks gestation who presents for IOL 2/2 pre eclampsia w/ SF (BP)    Active Hospital Problems    Diagnosis  POA    Pre-eclampsia, severe, antepartum, third trimester [O14.13]  Yes      Resolved Hospital Problems   No resolved problems to display.          Plan:   1. Encounter for induction of labor   Risks, benefits, alternatives and possible  complications have been discussed in detail with the patient.   - Consents signed and to chart  - Admit to Labor and Delivery unit  - US: vertex position verified  - GBS: negative, no ppx necessary   - Epidural per Anesthesia  - Draw CBC, T&S  - Notify Staff  - Recheck in 4 hrs or PRN    2. Pre eclampsia with severe features (BP)  - Patient presenting to the [facility] with headache, unresolved with fioricet/compazine  - Decision made to admit patient for headache as severe feature  - BP: (101-120)/(59-82) 104/70  - CBC/CMP: WNL, P/C: 0.12  - Continue to monitor headache    3. Sickle cell trait  - Hemoglobin electrophoresis on 10/10    4. History of incompetent cervix  - Braden cerclage placed on 12/30  - Removed in [facility] on 04/03      Post-Partum Hemorrhage risk - low          Anastasia Haji MD  OB/GYN  PGY-2

## 2020-05-07 NOTE — PROGRESS NOTES
Chief Complaint   Patient presents with    Routine Prenatal Visit     c/o headache, blurred vision with spots, epigastric pain       25 y.o., at 37w2d by Estimated Date of Delivery: 20    Complaints today: Headaches on and off for the past week.  Headache currently that started last night and is unrelieved with tylenol.  Associated blurry vision; pt had to have someone drive her to clinic today.  Also notes some vaginal irritation for about 3 days.  No discharge.    ROS  OBSTETRICS:   Contractions Occasional   Bleeding N   Loss of fluid N   Fetal mvmnt Y  GASTRO:   Nausea N   Vomiting N      OB History    Para Term  AB Living   4 3 3 0 0 3   SAB TAB Ectopic Multiple Live Births   0 0 0 0 3      # Outcome Date GA Lbr Tom/2nd Weight Sex Delivery Anes PTL Lv   4 Current            3 Term 17 38w1d / 00:13 2.835 kg (6 lb 4 oz) M Vag-Spont EPI N BURT   2 Term 14 38w0d  3.402 kg (7 lb 8 oz) M Vag-Spont EPI N BURT   1 Term 11 37w0d  3.147 kg (6 lb 15 oz) M Vag-Spont EPI N BURT       Dating reviewed  Allergies and problem list reviewed and updated  Medical and surgical history reviewed  Prenatal labs reviewed and updated    PHYSICAL EXAM  /82   Wt 81.3 kg (179 lb 3.7 oz)   BMI 30.77 kg/m²     GENERAL: No acute distress  HEENT: Normocephalic  NEURO: Alert and oriented x3  PSYCH: Normal mood and affect  PULMONARY: Non-labored respiration; no tachypnea  ABD: Soft, gravid, nontender; no hernia or hepatosplenomegaly  EXT GENITALIA: Normal  URETHRA: Normal, no tenderness  URETHRAL MEATUS: Normal  BLADDER: Normal  VAGINA: Normal, no discharge  CERVIX: Normal      ASSESSMENT AND PLAN    pregnancy Problems (from 19 to present)     Problem Noted Resolved    Pregnancy with poor reproductive history, no flu/TDAP/bot/patch 2019 by Joaquin Andrew MD No    Priority:  1 - High      Overview Signed 3/24/2020  4:27 PM by SILVIA Oakes MD     History of incompetent cervix  requiring cerclage in prior pregnancy.         Cervical cerclage suture present, antepartum 12/4/2019 by La Galdamez MD No    Priority:  2       Sickle cell trait 10/14/2019 by Vanessa Davenport MD No    Priority:  3       Antepartum anemia 5/4/2020 by SILVIA Oakes MD No    Benign gestational thrombocytopenia, antepartum 5/4/2020 by SILVIA Oakes MD No          37 weeks / Poor OB history  - Occasional contractions, not consistent  - Labs up to date  Headache  - On going headache unrelieved by medication with blurry vision  - Diastolic of 90 yesterday at home  - WZ=920/82 today  - Given on going headache, to [facility] for BP eval  Vaginal irritation  - No discharge on exam  - Affirm  - Will treat based on results    Labor and BP precautions given  Follow-up: 4-5 days for BP check if discharged home from [facility]

## 2020-05-08 PROCEDURE — 63600175 PHARM REV CODE 636 W HCPCS: Performed by: STUDENT IN AN ORGANIZED HEALTH CARE EDUCATION/TRAINING PROGRAM

## 2020-05-08 PROCEDURE — 25000003 PHARM REV CODE 250: Performed by: STUDENT IN AN ORGANIZED HEALTH CARE EDUCATION/TRAINING PROGRAM

## 2020-05-08 PROCEDURE — 11000001 HC ACUTE MED/SURG PRIVATE ROOM

## 2020-05-08 PROCEDURE — 72200005 HC VAGINAL DELIVERY LEVEL II

## 2020-05-08 PROCEDURE — 59409 OBSTETRICAL CARE: CPT | Mod: AT,,, | Performed by: OBSTETRICS & GYNECOLOGY

## 2020-05-08 PROCEDURE — 59409 PR OBSTETRICAL CARE,VAG DELIV ONLY: ICD-10-PCS | Mod: AT,,, | Performed by: OBSTETRICS & GYNECOLOGY

## 2020-05-08 RX ORDER — HYDROCODONE BITARTRATE AND ACETAMINOPHEN 10; 325 MG/1; MG/1
1 TABLET ORAL EVERY 4 HOURS PRN
Status: DISCONTINUED | OUTPATIENT
Start: 2020-05-08 | End: 2020-05-10 | Stop reason: HOSPADM

## 2020-05-08 RX ORDER — HYDROCODONE BITARTRATE AND ACETAMINOPHEN 5; 325 MG/1; MG/1
1 TABLET ORAL EVERY 4 HOURS PRN
Status: DISCONTINUED | OUTPATIENT
Start: 2020-05-08 | End: 2020-05-10 | Stop reason: HOSPADM

## 2020-05-08 RX ORDER — CALCIUM GLUCONATE 98 MG/ML
1 INJECTION, SOLUTION INTRAVENOUS
Status: DISCONTINUED | OUTPATIENT
Start: 2020-05-08 | End: 2020-05-10 | Stop reason: HOSPADM

## 2020-05-08 RX ORDER — DOCUSATE SODIUM 100 MG/1
200 CAPSULE, LIQUID FILLED ORAL 2 TIMES DAILY PRN
Status: DISCONTINUED | OUTPATIENT
Start: 2020-05-08 | End: 2020-05-10 | Stop reason: HOSPADM

## 2020-05-08 RX ORDER — DIPHENHYDRAMINE HCL 25 MG
25 CAPSULE ORAL EVERY 4 HOURS PRN
Status: DISCONTINUED | OUTPATIENT
Start: 2020-05-08 | End: 2020-05-10 | Stop reason: HOSPADM

## 2020-05-08 RX ORDER — PROCHLORPERAZINE MALEATE 5 MG
10 TABLET ORAL ONCE
Status: COMPLETED | OUTPATIENT
Start: 2020-05-08 | End: 2020-05-08

## 2020-05-08 RX ORDER — SODIUM CITRATE AND CITRIC ACID MONOHYDRATE 334; 500 MG/5ML; MG/5ML
30 SOLUTION ORAL ONCE
Status: COMPLETED | OUTPATIENT
Start: 2020-05-09 | End: 2020-05-09

## 2020-05-08 RX ORDER — DIPHENHYDRAMINE HYDROCHLORIDE 50 MG/ML
25 INJECTION INTRAMUSCULAR; INTRAVENOUS EVERY 4 HOURS PRN
Status: DISCONTINUED | OUTPATIENT
Start: 2020-05-08 | End: 2020-05-10 | Stop reason: HOSPADM

## 2020-05-08 RX ORDER — SIMETHICONE 80 MG
1 TABLET,CHEWABLE ORAL EVERY 6 HOURS PRN
Status: DISCONTINUED | OUTPATIENT
Start: 2020-05-08 | End: 2020-05-10 | Stop reason: HOSPADM

## 2020-05-08 RX ORDER — IBUPROFEN 600 MG/1
600 TABLET ORAL EVERY 6 HOURS
Qty: 30 TABLET | Refills: 1 | Status: SHIPPED | OUTPATIENT
Start: 2020-05-08

## 2020-05-08 RX ORDER — MISOPROSTOL 200 UG/1
TABLET ORAL
Status: DISCONTINUED
Start: 2020-05-08 | End: 2020-05-08 | Stop reason: WASHOUT

## 2020-05-08 RX ORDER — HYDROCORTISONE 25 MG/G
CREAM TOPICAL 3 TIMES DAILY PRN
Status: DISCONTINUED | OUTPATIENT
Start: 2020-05-08 | End: 2020-05-10 | Stop reason: HOSPADM

## 2020-05-08 RX ORDER — OXYTOCIN/RINGER'S LACTATE 30/500 ML
95 PLASTIC BAG, INJECTION (ML) INTRAVENOUS ONCE
Status: DISCONTINUED | OUTPATIENT
Start: 2020-05-08 | End: 2020-05-10 | Stop reason: HOSPADM

## 2020-05-08 RX ORDER — ONDANSETRON 8 MG/1
8 TABLET, ORALLY DISINTEGRATING ORAL EVERY 8 HOURS PRN
Status: DISCONTINUED | OUTPATIENT
Start: 2020-05-08 | End: 2020-05-10 | Stop reason: HOSPADM

## 2020-05-08 RX ORDER — PRENATAL WITH FERROUS FUM AND FOLIC ACID 3080; 920; 120; 400; 22; 1.84; 3; 20; 10; 1; 12; 200; 27; 25; 2 [IU]/1; [IU]/1; MG/1; [IU]/1; MG/1; MG/1; MG/1; MG/1; MG/1; MG/1; UG/1; MG/1; MG/1; MG/1; MG/1
1 TABLET ORAL DAILY
Status: DISCONTINUED | OUTPATIENT
Start: 2020-05-08 | End: 2020-05-10 | Stop reason: HOSPADM

## 2020-05-08 RX ORDER — SODIUM CHLORIDE, SODIUM LACTATE, POTASSIUM CHLORIDE, CALCIUM CHLORIDE 600; 310; 30; 20 MG/100ML; MG/100ML; MG/100ML; MG/100ML
INJECTION, SOLUTION INTRAVENOUS CONTINUOUS
Status: DISCONTINUED | OUTPATIENT
Start: 2020-05-08 | End: 2020-05-10 | Stop reason: HOSPADM

## 2020-05-08 RX ORDER — ACETAMINOPHEN 325 MG/1
650 TABLET ORAL EVERY 6 HOURS PRN
Status: DISCONTINUED | OUTPATIENT
Start: 2020-05-08 | End: 2020-05-10 | Stop reason: HOSPADM

## 2020-05-08 RX ORDER — FLUTICASONE PROPIONATE 50 MCG
1 SPRAY, SUSPENSION (ML) NASAL DAILY
Status: DISCONTINUED | OUTPATIENT
Start: 2020-05-08 | End: 2020-05-08

## 2020-05-08 RX ORDER — FLUTICASONE PROPIONATE 50 MCG
1 SPRAY, SUSPENSION (ML) NASAL DAILY
Status: DISCONTINUED | OUTPATIENT
Start: 2020-05-09 | End: 2020-05-10 | Stop reason: HOSPADM

## 2020-05-08 RX ORDER — IBUPROFEN 600 MG/1
600 TABLET ORAL EVERY 6 HOURS
Status: DISCONTINUED | OUTPATIENT
Start: 2020-05-08 | End: 2020-05-10 | Stop reason: HOSPADM

## 2020-05-08 RX ADMIN — IBUPROFEN 600 MG: 600 TABLET, FILM COATED ORAL at 12:05

## 2020-05-08 RX ADMIN — HYDROCODONE BITARTRATE AND ACETAMINOPHEN 1 TABLET: 10; 325 TABLET ORAL at 03:05

## 2020-05-08 RX ADMIN — Medication 95 MILLI-UNITS/MIN: at 01:05

## 2020-05-08 RX ADMIN — Medication 334 MILLI-UNITS/MIN: at 01:05

## 2020-05-08 RX ADMIN — MAGNESIUM SULFATE IN WATER 2 G/HR: 40 INJECTION, SOLUTION INTRAVENOUS at 11:05

## 2020-05-08 RX ADMIN — IBUPROFEN 600 MG: 600 TABLET, FILM COATED ORAL at 06:05

## 2020-05-08 RX ADMIN — PROCHLORPERAZINE MALEATE 10 MG: 5 TABLET ORAL at 01:05

## 2020-05-08 RX ADMIN — DIPHENHYDRAMINE HYDROCHLORIDE 25 MG: 25 CAPSULE ORAL at 02:05

## 2020-05-08 RX ADMIN — MISOPROSTOL 50 MCG: 100 TABLET ORAL at 06:05

## 2020-05-08 NOTE — PROGRESS NOTES
POSTPARTUM PROGRESS NOTE     Juanita Lipscomb is a 25 y.o. female PPD #1 status post Spontaneous vaginal delivery at 37w3d in a pregnancy complicated by PreE w/ SF (HA), anemia, ss trait. Patient is doing well this morning. She denies nausea, vomiting, fever or chills.  Patient reports mild abdominal pain that is adequately relieved by oral pain medications. Lochia is mild to moderate  and stable. Patient is voiding without difficulty and ambulating with no difficulty. She has passed flatus.  Patient does plan to breast feed. Desires patch  for contraception.    She denies headache, denies shortness of breath, denies spots in vision, denies RUQ pain.     Objective:       Temp:  [96.3 °F (35.7 °C)-98.3 °F (36.8 °C)] 97.6 °F (36.4 °C)  Pulse:  [66-97] 76  Resp:  [16-20] 20  SpO2:  [97 %-100 %] 100 %  BP: ()/(51-81) 113/69    General:   alert, appears stated age and cooperative   Lungs:   Non-labored respirations    Heart:   regular rate and rhythm   Abdomen:  Soft, nondistended    Uterus:  firm located at the umblicus.    Extremities: no pedal edema noted     Lab Review  No results found for this or any previous visit (from the past 4 hour(s)).    I/O    Intake/Output Summary (Last 24 hours) at 2020 0916  Last data filed at 2020 0800  Gross per 24 hour   Intake 3086.44 ml   Output 2588 ml   Net 498.44 ml        Assessment:     Patient Active Problem List   Diagnosis     (spontaneous vaginal delivery)    Sickle cell trait    Pregnancy with poor reproductive history, no flu/TDAP/bot/patch    Cervical cerclage suture present, antepartum    S/p cerclage removal 20    Antepartum anemia    Benign gestational thrombocytopenia, antepartum    Pre-eclampsia, severe, antepartum, third trimester        Plan:   1. Postpartum care:  - Patient doing well. Continue routine management and advances.  - Continue PO pain meds. Pain well controlled.  - Heme: H/h   - Encourage ambulation  - Contraception to  be discussed at 6 week pp visit  - Lactation consult PRN  - Rh positive    2. PreE w/ SF (HA)  - BP: ()/(51-81) 113/69  - s/p Mag  - P/C: 0.12  - Headache absent  - No indication for meds at this time    3. Anemia  - H/H: 9/28  - Asx  - Fe/Colace     Dispo: As patient meets milestones, will plan to discharge PPD1-2.    Alicia Matos M.D.  [facility] PGY1

## 2020-05-08 NOTE — L&D DELIVERY NOTE
[facility]  Vaginal Delivery   Operative Note    SUMMARY   After approximately 10 minutes of maternal effort,   Normal spontaneous vaginal delivery of live infant, was placed on mothers abdomen for skin to skin and bulb suctioning performed.  Infant delivered position OA over intact perineum.  Nuchal cord: No.    Spontaneous delivery of placenta and IV pitocin given noting good uterine tone.  No lacerations noted.  Patient tolerated delivery well. Sponge needle and lap counted correctly x2.    Indications: Pre-eclampsia, severe, antepartum, third trimester  Pregnancy complicated by:   Patient Active Problem List   Diagnosis     (spontaneous vaginal delivery)    Sickle cell trait    Pregnancy with poor reproductive history, no flu/TDAP/bot/patch    Cervical cerclage suture present, antepartum    S/p cerclage removal 20    Antepartum anemia    Benign gestational thrombocytopenia, antepartum    Pre-eclampsia, severe, antepartum, third trimester     Admitting GA: 37w3d    Delivery Information for Otilia Lipscomb    Birth information:  YOB: 2022   Time of birth: 12:58 AM   Sex: female   Head Delivery Date/Time: 2020 12:58 AM   Delivery type: Vaginal, Spontaneous   Gestational Age: 37w3d    Delivery Providers    Delivering clinician:  Loy Julian MD   Provider Role    MD Modesta Alejandre, RN     Yessica Samuels, RN     Glenn Mandujano, RN             Measurements    Weight:  2490 g  Length:  45.7 cm  Head circumference:  29.2 cm  Chest circumference:  33 cm         Apgars    Living status:  Living  Apgars:   1 min.:   5 min.:   10 min.:   15 min.:   20 min.:     Skin color:   1  1       Heart rate:   2  2       Reflex irritability:   2  2       Muscle tone:   2  2       Respiratory effort:   2  2       Total:   9  9       Apgars assigned by:  BEATRIZ         Operative Delivery    Forceps attempted?:  No  Vacuum extractor attempted?:  No          Shoulder Dystocia    Shoulder dystocia present?:  No           Presentation    Presentation:  Vertex           Interventions/Resuscitation    Method:  Bulb Suctioning, Tactile Stimulation       Cord    Vessels:  3 vessels  Complications:  Nuchal  Nuchal Intervention:  reduced  Number of Loops:  1  Delayed Cord Clamping?:  Yes  Cord Clamped Date/Time:  2020 12:59 AM  Cord Blood Disposition:  Sent with Baby  Gases Sent?:  No  Stem Cell Collection (by MD):  No       Placenta    Placenta delivery date/time:  2020 0103  Placenta removal:  Spontaneous  Placenta appearance:  Intact  Placenta disposition:  discarded           Labor Events:       labor: No     Labor Onset Date/Time:         Dilation Complete Date/Time: 2020 00:50     Start Pushing Date/Time: 2020 00:55       Start Pushing Date/Time: 2020 00:55     Rupture Date/Time: 20         Rupture type:           Fluid Amount:        Fluid Color: Clear      Fluid Odor:        Membrane Status: ARM (Artificial Rupture)               steroids: None     Antibiotics given for GBS: No     Induction: amniotomy;oxytocin     Indications for induction:  Severe Preeclampsia     Augmentation:       Indications for augmentation:       Labor complications: None     Additional complications:          Cervical ripening:                     Delivery:      Episiotomy: None     Indication for Episiotomy:       Perineal Lacerations: None Repaired:      Periurethral Laceration:   Repaired:     Labial Laceration:   Repaired:     Sulcus Laceration:   Repaired:     Vaginal Laceration:   Repaired:     Cervical Laceration:   Repaired:     Repair suture: None     Repair # of packets: 0     Last Value - EBL - Nursing (mL):       Sum - EBL - Nursing (mL): 0     Last Value - EBL - Anesthesia (mL):      Calculated QBL (mL): 73      Vaginal Sweep Performed: Yes     Surgicount Correct: Yes       Other providers:       Anesthesia    Method:   Epidural          Details (if applicable):  Trial of Labor      Categorization:      Priority:     Indications for :     Incision Type:       Additional  information:  Forceps:    Vacuum:    Breech:    Observed anomalies    Other (Comments):

## 2020-05-08 NOTE — PROGRESS NOTES
"LABOR NOTE    S:  Complaints: No.  Epidural working:  yes  MD to bedside for cervical check    O: /61   Pulse 68   Temp 96.3 °F (35.7 °C)   Resp 16   Ht 5' 4" (1.626 m)   Wt 83 kg (183 lb)   SpO2 100%   Breastfeeding? No   BMI 31.41 kg/m²       FHT: 120, mod angy, + accels, - decels Cat 1 (reassuring)  CTX: q 4 minutes  SVE: 3/60/-3 @1930      ASSESSMENT:   25 y.o.  at 37w2d, here for IOL 2/2 pre-e w/SF (headache)    FHT reassuring    Active Hospital Problems    Diagnosis  POA    Pre-eclampsia, severe, antepartum, third trimester [O14.13]  Yes      Resolved Hospital Problems   No resolved problems to display.     Labor Course:  1400: 1.5/50/-3  1530: cytotec #1  0: 3/60/-3, starting pit    PLAN:    Continue Close Maternal/Fetal Monitoring  Pitocin Augmentation per protocol  Recheck 2 hours or PRN    Gladys Garrido M.D.   OB/GYN  PGY-1      "

## 2020-05-08 NOTE — PROGRESS NOTES
"LABOR NOTE    S:  Complaints: No.  Epidural working:  yes  MD to bedside for cervical check    O: /70   Pulse 73   Temp 97.2 °F (36.2 °C)   Resp 16   Ht 5' 4" (1.626 m)   Wt 83 kg (183 lb)   SpO2 100%   Breastfeeding? No   BMI 31.41 kg/m²       FHT: 130, mod angy, + accels, - decels Cat 1 (reassuring)  CTX: q 1-2 minutes  SVE: /-2 @2230, AROM clr      ASSESSMENT:   25 y.o.  at 37w2d, here for IOL 2/2 pre-e w/SF (headache)    FHT reassuring    Active Hospital Problems    Diagnosis  POA    Pre-eclampsia, severe, antepartum, third trimester [O14.13]  Yes      Resolved Hospital Problems   No resolved problems to display.     Labor Course:  1400: 1.5/50/-3  1530: cytotec #1  1930: 3/60/-3, starting pit  2230: /-2, AROM clr    PLAN:    Continue Close Maternal/Fetal Monitoring  Pitocin Augmentation per protocol  Recheck 2 hours or PRN    Gladys Garrido M.D.   OB/GYN  PGY-1      "

## 2020-05-08 NOTE — PROGRESS NOTES
Patient encouraged to provide breastmilk for her baby. Benefits of breastmilk discussed with mother. Mother declines pumping or breastfeeding at this time. Mother encouraged to inform nurse if she changes her mind.      Baby Led Bottle Feeding education   · Wash your hands.  · Feed Baby on cue, not on a schedule. Babies give cues when ready to feed. Cues are soft sounds like grunts, moving arms and leg, licking lips, turning head to the side with an open mouth, and sucking hands/fingers.  · Hold baby skin to skin during feedings. Look into babys eyes, talk to baby, and stroke baby while baby suckles.  · Baby should be fed 8 or more times a day depending on babys cues. Some babies may be sleepy and may need to be awakened for their feeds to get the 8 feeds a day needed.  · Hold the baby close while feeding and never prop a bottle.  · Hold baby upright supporting head and neck.  · Place the tip of nipple below babys nose, rubbing top lip and allow baby to open mouth and accept the nipple.  · Hold the bottle horizontally, (level with the ground), tilt the bottle just enough to get milk in the nipple.  · Watch for stress cues during feeding. Be alert for baby wrinkling eyebrow, baby turning head away from bottle, or getting fussy. Baby may need a break.  · Once baby releases nipple or pulls away, do not force baby to finish bottle. Baby is full when sucks slow or stops, arms relax, turns away from nipple, pushes away or falls asleep.  · Pace the feeding, feed slowly so that baby is given 15-20 minutes with breaks to burp every 10-15mls.  · Alternate arms part way through feeding to allow stimulation to both babys eyes.  · Use formula within one hour of starting feeding. Throw away left over formula.     Mother able to demonstrate baby led bottlefeeding

## 2020-05-09 PROCEDURE — 11000001 HC ACUTE MED/SURG PRIVATE ROOM

## 2020-05-09 PROCEDURE — 25000003 PHARM REV CODE 250: Performed by: STUDENT IN AN ORGANIZED HEALTH CARE EDUCATION/TRAINING PROGRAM

## 2020-05-09 PROCEDURE — 93010 EKG 12-LEAD: ICD-10-PCS | Mod: ,,, | Performed by: INTERNAL MEDICINE

## 2020-05-09 PROCEDURE — 93005 ELECTROCARDIOGRAM TRACING: CPT

## 2020-05-09 PROCEDURE — 99231 SBSQ HOSP IP/OBS SF/LOW 25: CPT | Mod: ,,, | Performed by: OBSTETRICS & GYNECOLOGY

## 2020-05-09 PROCEDURE — 99231 PR SUBSEQUENT HOSPITAL CARE,LEVL I: ICD-10-PCS | Mod: ,,, | Performed by: OBSTETRICS & GYNECOLOGY

## 2020-05-09 PROCEDURE — 25000242 PHARM REV CODE 250 ALT 637 W/ HCPCS: Performed by: STUDENT IN AN ORGANIZED HEALTH CARE EDUCATION/TRAINING PROGRAM

## 2020-05-09 PROCEDURE — 93010 ELECTROCARDIOGRAM REPORT: CPT | Mod: ,,, | Performed by: INTERNAL MEDICINE

## 2020-05-09 RX ORDER — DOCUSATE SODIUM 100 MG/1
200 CAPSULE, LIQUID FILLED ORAL 2 TIMES DAILY PRN
Qty: 30 CAPSULE | Refills: 0 | Status: SHIPPED | OUTPATIENT
Start: 2020-05-09

## 2020-05-09 RX ADMIN — IBUPROFEN 600 MG: 600 TABLET, FILM COATED ORAL at 11:05

## 2020-05-09 RX ADMIN — IBUPROFEN 600 MG: 600 TABLET, FILM COATED ORAL at 12:05

## 2020-05-09 RX ADMIN — HYDROCODONE BITARTRATE AND ACETAMINOPHEN 1 TABLET: 10; 325 TABLET ORAL at 05:05

## 2020-05-09 RX ADMIN — FLUTICASONE PROPIONATE 50 MCG: 50 SPRAY, METERED NASAL at 01:05

## 2020-05-09 RX ADMIN — SODIUM CITRATE AND CITRIC ACID MONOHYDRATE 30 ML: 500; 334 SOLUTION ORAL at 12:05

## 2020-05-09 RX ADMIN — IBUPROFEN 600 MG: 600 TABLET, FILM COATED ORAL at 05:05

## 2020-05-09 RX ADMIN — PRENATAL VIT W/ FE FUMARATE-FA TAB 27-0.8 MG 1 TABLET: 27-0.8 TAB at 09:05

## 2020-05-09 RX ADMIN — HYDROCODONE BITARTRATE AND ACETAMINOPHEN 1 TABLET: 10; 325 TABLET ORAL at 06:05

## 2020-05-09 RX ADMIN — HYDROCODONE BITARTRATE AND ACETAMINOPHEN 1 TABLET: 10; 325 TABLET ORAL at 11:05

## 2020-05-09 NOTE — LACTATION NOTE
"LC consult per pt request. Pt states she wants to do "1/2 breast and 1/2 with the bottle." Infant has not yet latched, has been receiving exclusively formula. Support and encouragement provided. Education provided on risks of bottle feeding and formula with breastfeeding especially related to adequate latch and production. Discussed use of breast pump, pt states she isn't sure if she wants to pump at this time.    Lactation Basics education completed. LC reviewed Breastfeeding Guide and encouraged tracking feeds and output. Encouraged use of STS, frequent feeds on demand, offering breast before formula feeding and using paced bottle feeding. Pt verbalized understanding and questions answered. Pt aware to call LC for assistance with feeding.     "

## 2020-05-09 NOTE — ANESTHESIA POSTPROCEDURE EVALUATION
Anesthesia Post Evaluation    Patient: Juanita Lipscomb    Procedure(s) Performed: * No procedures listed *    Final Anesthesia Type: epidural    Patient location during evaluation: labor & delivery  Patient participation: Yes- Able to Participate  Level of consciousness: awake and alert  Post-procedure vital signs: reviewed and stable  Pain management: adequate  Airway patency: patent    PONV status at discharge: No PONV  Anesthetic complications: no      Cardiovascular status: blood pressure returned to baseline and hemodynamically stable  Respiratory status: unassisted, spontaneous ventilation and room air  Hydration status: euvolemic  Follow-up not needed.          Vitals Value Taken Time   /77 5/9/2020  2:05 AM   Temp 36.8 °C (98.3 °F) 5/9/2020 12:00 AM   Pulse 77 5/9/2020  2:05 AM   Resp 20 5/9/2020 12:00 AM   SpO2 99 % 5/9/2020  2:05 AM         No case tracking events are documented in the log.      Pain/Dewayne Score: Pain Rating Prior to Med Admin: 9 (5/9/2020  5:29 AM)  Pain Rating Post Med Admin: 4 (5/8/2020  1:00 PM)

## 2020-05-09 NOTE — LACTATION NOTE
Wide gape and deep latch achieved, educated on breast compression and infant has multiple audible swallows. Infant releases breast and nipple round. Infant's body relaxed and infant sleeping. Support and encouragement provided to pt.      05/09/20 1500   Maternal Assessment   Breast Shape pendulous   Breast Density soft   Areola elastic   Nipples everted   Maternal Infant Feeding   Maternal Emotional State assist needed   Infant Positioning clutch/football   Signs of Milk Transfer audible swallow;infant jaw motion present   Pain with Feeding no   Nipple Shape After Feeding, Left round   Latch Assistance yes

## 2020-05-09 NOTE — NURSING
rn is unable to schedule pt's follow up bp check visit, pt states she has a stay connected mom at home, will monitor bp daily and call md if >140/90 or if has any s/s of pre-E, dr arias messaged and asked if office would reach out to pt and schedule virtual visit for wed 5/15, pt asked to call office and schedule if didn't hear from them on monday

## 2020-05-09 NOTE — CARE UPDATE
MD to bedside for patient complaint of chest pain.  Says it feels like chest pressure.   Denies other PreE symptoms.  Lungs CTA.   Heart RRR.  Brachial reflexes 2+  Will get EKG and give Bi-citra  Patient well appearing and laughing.   Continue to monitor. Mag d/c @ 0760    Alicia Matos M.D.  [facility] PGY1

## 2020-05-09 NOTE — DISCHARGE INSTRUCTIONS
Breastfeeding Discharge Instructions       Feed the baby at the earliest sign of hunger or comfort  o Hands to mouth, sucking motions  o Rooting or searching for something to suck on  o Dont wait for crying - it is a sign of distress     The feedings may be 8-12 times per 24hrs and will not follow a schedule   Avoid pacifiers and bottles for the first 4 weeks   Alternate the breast you start the feeding with, or start with the breast that feels the fullest   Switch breasts when the baby takes himself off the breast or falls asleep   Keep offering breasts until the baby looks full, no longer gives hunger signs, and stays asleep when placed on his back in the crib   If the baby is sleepy and wont wake for a feeding, put the baby skin-to-skin dressed in a diaper against the mothers bare chest   Sleep near your baby   The baby should be positioned and latched on to the breast correctly  o Chest-to-chest, chin in the breast  o Babys lips are flipped outward  o Babys mouth is stretched open wide like a shout  o Babys sucking should feel like tugging to the mother  - The baby should be drinking at the breast:  o You should hear swallowing or gulping throughout the feeding  o You should see milk on the babys lips when he comes off the breast  o Your breasts should be softer when the baby is finished feeding  o The baby should look relaxed at the end of feedings  o After the 4th day and your milk is in:  o The babys poop should turn bright yellow and be loose, watery, and seedy  o The baby should have at least 3-4 poops the size of the palm of your hand per day  o The baby should have at least 5-6 wet diapers per day  o The urine should be light yellow in color  You should drink when you are thirsty and eat a healthy diet when you are    hungry.     Take naps to get the rest you need.   Take medications and/or drink alcohol only with permission of your obstetrician    or the babys pediatrician.  You can  also call the Infant Risk Center,   ([phone]), Monday-Friday, 8am-5pm Central time, to get the most   up-to-date evidence-based information on the use of medications during   pregnancy and breastfeeding.      The baby should be examined by a pediatrician at 3-5 days of age.  Once your   milk comes in, the baby should be gaining at least ½ - 1oz each day and should be back to birthweight no later than 10-14 days of age.          Community Resources    [facility] Breastfeeding Warmline: [phone]   Local [facility] clinics: provide incentives and breastpumps to eligible mothers  [employer] ([employer]LI):  mother-to-mother support group website        www.[employer].[place]  Local La Leche League mother-to-mother support groups:        www.Nommunity        La Leche League [facility]   Dr. Ricci Oliver website for latch videos and general information:        www.breastfeedinginc.ca  [facility] is a call center that provides information about the safety of taking medications while breastfeeding.  Call 1-831.741.4034, M-F, 8am-5pm, CT.  International Lactation Consultant Association provides resources for assistance:        www.ilca.org  [place] Breastfeeding Coalition provides informationand resources for parents  and the community    www.LaBreastfeedingSupport.org     Alem Boone is a mom-to-mom support group:                             www.[employer].com//breastfeedng-support/  [employer] for Healthy Babies:  4-377-544-BABY(9934)  [employer]: a breastfeeding support group for women of color, 844.230.8341

## 2020-05-09 NOTE — DISCHARGE SUMMARY
Delivery Discharge Summary  Obstetrics      Primary OB Clinician: Vanessa Davenport MD      Admission date: 2020  Discharge date: 05/10/2020    Disposition: To home, self care    Discharge Diagnosis List:      Patient Active Problem List   Diagnosis     (spontaneous vaginal delivery)    Sickle cell trait    Pregnancy with poor reproductive history, no flu/TDAP/bot/patch    Cervical cerclage suture present, antepartum    S/p cerclage removal 20    Antepartum anemia    Benign gestational thrombocytopenia, antepartum    Pre-eclampsia, severe, antepartum, third trimester       Procedure:     Hospital Course:  Juanita Lipscomb is a 25 y.o. now , PPD #2 who was admitted on 2020 at 37w2d for IOL after presenting to the [facility] with complaints of a headache. The headache was unrelieved by multiple medications and so decision was made to proceed with IOL 2/2 atypical PreE w/ SF (headache). Patient was subsequently admitted to labor and delivery unit with signed consents.     Labor course was uncomplicated and resulted in  without complications.     Please see delivery note for further details. Her postpartum course was uncomplicated. On discharge day, patient's pain is controlled with oral pain medications. Pt is tolerating ambulation without SOB or CP, and regular diet without N/V. Reports lochia is mild. Denies any HA, vision changes, F/C, LE swelling. Denies any breast pain/soreness.    Pt in stable condition and ready for discharge. She has been instructed to start and/or continue medications and follow up with her obstetrics provider as listed below.    Pertinent studies:  CBC  Recent Labs   Lab 20  1120   WBC 8.99   HGB 8.8*   HCT 27.8*   MCV 82             Immunization History   Administered Date(s) Administered    DTP 10/14/1998    HPV Quadrivalent 2009, 2010, 2011    Hepatitis B, Pediatric/Adolescent 2009    Influenza - Quadrivalent - PF (6 months  and older) 11/20/2016    MMR 10/14/1998    Meningococcal Conjugate (MCV4P) 08/20/2009, 07/18/2011    OPV 10/14/1998    Tdap 08/20/2009, 01/18/2017, 02/11/2020        Delivery:    Episiotomy: None   Lacerations: None   Repair suture: None   Repair # of packets: 0   Blood loss (ml):       Birth information:  YOB: 2022   Time of birth: 12:58 AM   Sex: female   Delivery type: Vaginal, Spontaneous   Gestational Age: 37w3d    Delivery Clinician:      Other providers:       Additional  information:  Forceps:    Vacuum:    Breech:    Observed anomalies      Living?:           APGARS  One minute Five minutes Ten minutes   Skin color:         Heart rate:         Grimace:         Muscle tone:         Breathing:         Totals: 9  9        Placenta: Delivered:       appearance      Patient Instructions:   Current Discharge Medication List      START taking these medications    Details   docusate sodium (COLACE) 100 MG capsule Take 2 capsules (200 mg total) by mouth 2 (two) times daily as needed for Constipation.  Qty: 30 capsule, Refills: 0      ibuprofen (ADVIL,MOTRIN) 600 MG tablet Take 1 tablet (600 mg total) by mouth every 6 (six) hours.  Qty: 30 tablet, Refills: 1         CONTINUE these medications which have NOT CHANGED    Details   famotidine (PEPCID) 40 MG tablet Take 1 tablet (40 mg total) by mouth every evening.  Qty: 30 tablet, Refills: 2    Associated Diagnoses: Heartburn during pregnancy in third trimester      ferrous sulfate (FEOSOL) 325 mg (65 mg iron) Tab tablet Take 1 tablet (325 mg total) by mouth once daily.  Qty: 30 tablet, Refills: 3    Associated Diagnoses: Anemia during pregnancy in third trimester      fluticasone propionate (FLONASE) 50 mcg/actuation nasal spray U 1 SPRAY IEN ONCE D FOR ALLERGIES      ondansetron (ZOFRAN-ODT) 8 MG TbDL Take 1 tablet (8 mg total) by mouth every 6 (six) hours as needed.  Qty: 60 tablet, Refills: 3    Associated Diagnoses: Nausea/vomiting in pregnancy       PRENATAL VIT #76/IRON,CARB/FA (PNV 29-1 ORAL) Take 1 tablet by mouth once daily.      prenatal92-iron-folate8-ps-dha (ENBRACE HR) 1.5 mg iron- 8.73 mg-6.4 mg CpID Take 1 tablet by mouth once daily.  Qty: 90 each, Refills: 2    Associated Diagnoses: Supervision of high risk pregnancy in first trimester      promethazine (PHENERGAN) 25 MG tablet Take 1 tablet (25 mg total) by mouth every 6 (six) hours as needed for Nausea.  Qty: 60 tablet, Refills: 3    Associated Diagnoses: Supervision of high risk pregnancy in second trimester             Discharge Procedure Orders   Diet Adult Regular     Other restrictions (specify):   Order Comments: No heavy lifting over 5 lbs  No baths until 6 week check up. Showers only.   No driving until off narcotic pain medication/not feeling drowsy or dizzy when driving.     Notify your health care provider if you experience any of the following:  temperature >100.4     Notify your health care provider if you experience any of the following:  persistent nausea and vomiting or diarrhea     Notify your health care provider if you experience any of the following:  severe uncontrolled pain     Notify your health care provider if you experience any of the following:  redness, tenderness, or signs of infection (pain, swelling, redness, odor or green/yellow discharge around incision site)     Notify your health care provider if you experience any of the following:  difficulty breathing or increased cough     Notify your health care provider if you experience any of the following:  severe persistent headache     Notify your health care provider if you experience any of the following:  worsening rash     Notify your health care provider if you experience any of the following:  persistent dizziness, light-headedness, or visual disturbances     Notify your health care provider if you experience any of the following:  increased confusion or weakness     Notify your health care provider if you  experience any of the following:   Order Comments: Call if you are saturating more than 1 pad an hour for 2 consecutive hours.       Follow-up Information     Vanessa Davenport MD In 6 weeks.    Specialties:  Obstetrics, Obstetrics and Gynecology  Why:  postpartum visit  Contact information:  [address]  [facility] [address] [phone]             [facility] [address] [address] [facility]    Specialty:  Obstetrics and Gynecology  Why:  Blood pressure check  Contact information:  [address]  [facility] [address] [phone]  Additional information:  [facility], 1st Floor   Please park in Chloe Loera MD  OBGYN PGY-1

## 2020-05-10 VITALS
RESPIRATION RATE: 18 BRPM | BODY MASS INDEX: 31.24 KG/M2 | OXYGEN SATURATION: 99 % | DIASTOLIC BLOOD PRESSURE: 72 MMHG | SYSTOLIC BLOOD PRESSURE: 110 MMHG | WEIGHT: 183 LBS | HEIGHT: 64 IN | TEMPERATURE: 99 F | HEART RATE: 63 BPM

## 2020-05-10 PROCEDURE — 99239 PR HOSPITAL DISCHARGE DAY,>30 MIN: ICD-10-PCS | Mod: ,,, | Performed by: OBSTETRICS & GYNECOLOGY

## 2020-05-10 PROCEDURE — 99239 HOSP IP/OBS DSCHRG MGMT >30: CPT | Mod: ,,, | Performed by: OBSTETRICS & GYNECOLOGY

## 2020-05-10 PROCEDURE — 25000003 PHARM REV CODE 250: Performed by: STUDENT IN AN ORGANIZED HEALTH CARE EDUCATION/TRAINING PROGRAM

## 2020-05-10 RX ADMIN — HYDROCODONE BITARTRATE AND ACETAMINOPHEN 1 TABLET: 10; 325 TABLET ORAL at 04:05

## 2020-05-10 RX ADMIN — IBUPROFEN 600 MG: 600 TABLET, FILM COATED ORAL at 05:05

## 2020-05-10 RX ADMIN — PRENATAL VIT W/ FE FUMARATE-FA TAB 27-0.8 MG 1 TABLET: 27-0.8 TAB at 08:05

## 2020-05-10 RX ADMIN — FLUTICASONE PROPIONATE 50 MCG: 50 SPRAY, METERED NASAL at 08:05

## 2020-05-10 NOTE — PLAN OF CARE
Lactation note:   notified this morning that mom plans to formula feed. Mom called LC after for breastfeeding assistance; infant just had formula and not interested in eating. Reviewed lactation discharge teaching with the breastfeeding guide as reference. Due to formula use,LC emphasized how to manage engorgement and the supply/demand of breast milk. Mom to look into getting a breast pump from insurance. Encouraged mom to put baby to breast 8 or more times in 24 hours and let her nurse until content. Using massage and warm compresses will help milk flow and then use ice packs after nursing. Mom to hand express after nursing if still full until she gets a pump. Reviewed milk storage guidelines.If mom has concerns she can call the lactation line for further help.

## 2020-05-10 NOTE — PROGRESS NOTES
POSTPARTUM PROGRESS NOTE     Juanita Lipscomb is a 25 y.o. female PPD #2 status post Spontaneous vaginal delivery at 37w3d in a pregnancy complicated by PreE w/ SF (HA), anemia, ss trait. Patient is doing well this morning. She denies nausea, vomiting, fever or chills.  Patient reports mild abdominal pain that is adequately relieved by oral pain medications. Lochia is mild to moderate  and stable. Patient is voiding without difficulty and ambulating with no difficulty. She has passed flatus.  Patient does plan to bottle feed. Desires patch  for contraception.    She denies headache, denies shortness of breath, denies spots in vision, denies RUQ pain.     Objective:       Temp:  [97.9 °F (36.6 °C)-98.9 °F (37.2 °C)] 98.3 °F (36.8 °C)  Pulse:  [64-77] 77  Resp:  [17-18] 17  SpO2:  [98 %-100 %] 98 %  BP: (105-117)/(61-74) 112/71    General:   alert, appears stated age and cooperative   Lungs:   Non-labored respirations    Heart:   regular rate and rhythm   Abdomen:  Soft, nondistended    Uterus:  firm located at the umblicus.    Extremities: no pedal edema noted     Lab Review  No results found for this or any previous visit (from the past 4 hour(s)).    I/O  No intake or output data in the 24 hours ending 05/10/20 0506     Assessment:     Patient Active Problem List   Diagnosis     (spontaneous vaginal delivery)    Sickle cell trait    Pregnancy with poor reproductive history, no flu/TDAP/bot/patch    Cervical cerclage suture present, antepartum    S/p cerclage removal 20    Antepartum anemia    Benign gestational thrombocytopenia, antepartum    Pre-eclampsia, severe, antepartum, third trimester        Plan:   1. Postpartum care:  - Patient doing well. Continue routine management and advances.  - Continue PO pain meds. Pain well controlled.  - Heme: H/h   - Encourage ambulation  - Contraception to be discussed at 6 week pp visit  - Lactation consult PRN  - Rh positive    2. PreE w/ SF (HA)  -BP:  (105117)/(6174) 112/71  - s/p Mag  - P/C: 0.12  - Headache absent  - No indication for meds at this time    3. Anemia  - H/H: 9/28  - Asx  - Fe/Colace     Dispo: As patient meets milestones, will plan to discharge PPD2.    Alicia Matos M.D.  [facility] PGY1

## 2020-05-10 NOTE — LACTATION NOTE
05/10/20 1120   Maternal Assessment   Breast Shape Bilateral:;pendulous   Breast Density Bilateral:;filling   Areola Bilateral:;elastic   Nipples Bilateral:;everted   Maternal Infant Feeding   Maternal Emotional State assist needed   Latch Assistance yes  (but no latch obtained; baby just ate formula in a bottle)   Breast Pumping   Breast Pumping Interventions post-feed pumping encouraged   Lactation Referrals   Lactation Referrals support group;other (see comments)  ([facility] [place] lactation)   Lactation note:  Mom plans to do both breast and formula in hospital but has only latched baby once during the stay. She called for help at this time but baby just ate formula and not interested in feeding. Discussed importance of feeding baby at breast first for a good milk supply, 8 or more times in 24 hours and only give formula if baby not full after nursing. Mom to look into getting breast pump and will use hand expression after nursing to provide more breast milk to infant. Mom has breastfeeding guide for resources.

## 2020-05-12 ENCOUNTER — PATIENT MESSAGE (OUTPATIENT)
Dept: OBSTETRICS AND GYNECOLOGY | Facility: CLINIC | Age: 25
End: 2020-05-12

## 2020-05-12 DIAGNOSIS — O26.893 HEARTBURN DURING PREGNANCY IN THIRD TRIMESTER: ICD-10-CM

## 2020-05-12 DIAGNOSIS — R12 HEARTBURN DURING PREGNANCY IN THIRD TRIMESTER: ICD-10-CM

## 2020-05-12 DIAGNOSIS — B37.31 VAGINITIS DUE TO CANDIDA: Primary | ICD-10-CM

## 2020-05-12 RX ORDER — TERCONAZOLE 4 MG/G
1 CREAM VAGINAL NIGHTLY
Qty: 1 TUBE | Refills: 0 | Status: SHIPPED | OUTPATIENT
Start: 2020-05-12 | End: 2020-05-19

## 2020-05-12 RX ORDER — FAMOTIDINE 40 MG/1
40 TABLET, FILM COATED ORAL NIGHTLY
Qty: 30 TABLET | Refills: 2 | Status: CANCELLED | OUTPATIENT
Start: 2020-05-12 | End: 2021-05-12

## 2020-05-12 RX ORDER — BREAST PUMP
1 EACH MISCELLANEOUS
Qty: 1 DEVICE | Refills: 0 | Status: SHIPPED | OUTPATIENT
Start: 2020-05-12 | End: 2020-08-19

## 2020-05-12 RX ORDER — FAMOTIDINE 40 MG/1
40 TABLET, FILM COATED ORAL NIGHTLY
Qty: 30 TABLET | Refills: 2 | Status: SHIPPED | OUTPATIENT
Start: 2020-05-12 | End: 2021-05-12

## 2020-05-12 NOTE — TELEPHONE ENCOUNTER
----- Message from Nicole Willard MA sent at 5/12/2020 10:16 AM CDT -----  Pt requesting Rx for breast pump.    ----- Message -----  From: Abdoulaye Camacho  Sent: 5/12/2020   9:59 AM CDT  To: [facility] Staff    Pp pt would like to get breast pump. Pt can be reached at [phone].

## 2020-05-13 ENCOUNTER — NURSE TRIAGE (OUTPATIENT)
Dept: ADMINISTRATIVE | Facility: CLINIC | Age: 25
End: 2020-05-13

## 2020-05-13 NOTE — TELEPHONE ENCOUNTER
Denies symptoms    Reason for Disposition   Health Information question, no triage required and triager able to answer question    Protocols used: INFORMATION ONLY CALL-A-AH

## 2020-05-29 ENCOUNTER — PATIENT MESSAGE (OUTPATIENT)
Dept: ADMINISTRATIVE | Facility: OTHER | Age: 25
End: 2020-05-29

## 2020-06-10 ENCOUNTER — TELEPHONE (OUTPATIENT)
Dept: OBSTETRICS AND GYNECOLOGY | Facility: CLINIC | Age: 25
End: 2020-06-10

## 2020-06-10 NOTE — TELEPHONE ENCOUNTER
----- Message from Rayna Verma MA sent at 6/10/2020  3:26 PM CDT -----      ----- Message -----  From: Abdoulaye Camacho  Sent: 6/10/2020   2:39 PM CDT  To: [facility] Staff    Pt returning Dr. Oakes phone call. Pt can be reached at [phone].

## 2021-04-19 PROBLEM — O09.299 PREGNANCY WITH POOR REPRODUCTIVE HISTORY, ANTEPARTUM: Status: RESOLVED | Noted: 2019-11-07 | Resolved: 2021-04-19
